# Patient Record
Sex: FEMALE | Race: WHITE | NOT HISPANIC OR LATINO | URBAN - METROPOLITAN AREA
[De-identification: names, ages, dates, MRNs, and addresses within clinical notes are randomized per-mention and may not be internally consistent; named-entity substitution may affect disease eponyms.]

---

## 2017-08-12 ENCOUNTER — OUTPATIENT (OUTPATIENT)
Dept: OUTPATIENT SERVICES | Facility: HOSPITAL | Age: 52
LOS: 1 days | Discharge: HOME | End: 2017-08-12

## 2017-08-12 DIAGNOSIS — Z12.31 ENCOUNTER FOR SCREENING MAMMOGRAM FOR MALIGNANT NEOPLASM OF BREAST: ICD-10-CM

## 2018-10-13 ENCOUNTER — OUTPATIENT (OUTPATIENT)
Dept: OUTPATIENT SERVICES | Facility: HOSPITAL | Age: 53
LOS: 1 days | Discharge: HOME | End: 2018-10-13

## 2018-10-13 DIAGNOSIS — Z12.31 ENCOUNTER FOR SCREENING MAMMOGRAM FOR MALIGNANT NEOPLASM OF BREAST: ICD-10-CM

## 2018-10-22 ENCOUNTER — OUTPATIENT (OUTPATIENT)
Dept: OUTPATIENT SERVICES | Facility: HOSPITAL | Age: 53
LOS: 1 days | Discharge: HOME | End: 2018-10-22

## 2019-11-09 ENCOUNTER — OUTPATIENT (OUTPATIENT)
Dept: OUTPATIENT SERVICES | Facility: HOSPITAL | Age: 54
LOS: 1 days | Discharge: HOME | End: 2019-11-09
Payer: COMMERCIAL

## 2019-11-09 DIAGNOSIS — Z12.31 ENCOUNTER FOR SCREENING MAMMOGRAM FOR MALIGNANT NEOPLASM OF BREAST: ICD-10-CM

## 2019-11-09 PROCEDURE — 77067 SCR MAMMO BI INCL CAD: CPT | Mod: 26

## 2019-11-09 PROCEDURE — 77063 BREAST TOMOSYNTHESIS BI: CPT | Mod: 26

## 2021-07-27 ENCOUNTER — INPATIENT (INPATIENT)
Facility: HOSPITAL | Age: 56
LOS: 2 days | Discharge: HOME HEALTH | End: 2021-07-30
Attending: FAMILY MEDICINE | Admitting: FAMILY MEDICINE
Payer: COMMERCIAL

## 2021-07-27 VITALS
TEMPERATURE: 100 F | OXYGEN SATURATION: 99 % | DIASTOLIC BLOOD PRESSURE: 96 MMHG | RESPIRATION RATE: 18 BRPM | SYSTOLIC BLOOD PRESSURE: 143 MMHG | HEART RATE: 100 BPM | WEIGHT: 162.04 LBS | HEIGHT: 62 IN

## 2021-07-27 DIAGNOSIS — L03.90 CELLULITIS, UNSPECIFIED: ICD-10-CM

## 2021-07-27 LAB
ALBUMIN SERPL ELPH-MCNC: 4.7 G/DL — SIGNIFICANT CHANGE UP (ref 3.5–5.2)
ALP SERPL-CCNC: 78 U/L — SIGNIFICANT CHANGE UP (ref 30–115)
ALT FLD-CCNC: 24 U/L — SIGNIFICANT CHANGE UP (ref 0–41)
ANION GAP SERPL CALC-SCNC: 13 MMOL/L — SIGNIFICANT CHANGE UP (ref 7–14)
AST SERPL-CCNC: 22 U/L — SIGNIFICANT CHANGE UP (ref 0–41)
BASOPHILS # BLD AUTO: 0.07 K/UL — SIGNIFICANT CHANGE UP (ref 0–0.2)
BASOPHILS NFR BLD AUTO: 0.7 % — SIGNIFICANT CHANGE UP (ref 0–1)
BILIRUB SERPL-MCNC: 0.5 MG/DL — SIGNIFICANT CHANGE UP (ref 0.2–1.2)
BUN SERPL-MCNC: 10 MG/DL — SIGNIFICANT CHANGE UP (ref 10–20)
CALCIUM SERPL-MCNC: 9.2 MG/DL — SIGNIFICANT CHANGE UP (ref 8.5–10.1)
CHLORIDE SERPL-SCNC: 105 MMOL/L — SIGNIFICANT CHANGE UP (ref 98–110)
CO2 SERPL-SCNC: 21 MMOL/L — SIGNIFICANT CHANGE UP (ref 17–32)
CREAT SERPL-MCNC: 0.8 MG/DL — SIGNIFICANT CHANGE UP (ref 0.7–1.5)
EOSINOPHIL # BLD AUTO: 0.22 K/UL — SIGNIFICANT CHANGE UP (ref 0–0.7)
EOSINOPHIL NFR BLD AUTO: 2.2 % — SIGNIFICANT CHANGE UP (ref 0–8)
GLUCOSE SERPL-MCNC: 111 MG/DL — HIGH (ref 70–99)
HCG SERPL QL: NEGATIVE — SIGNIFICANT CHANGE UP
HCT VFR BLD CALC: 37.7 % — SIGNIFICANT CHANGE UP (ref 37–47)
HGB BLD-MCNC: 12.7 G/DL — SIGNIFICANT CHANGE UP (ref 12–16)
IMM GRANULOCYTES NFR BLD AUTO: 0.3 % — SIGNIFICANT CHANGE UP (ref 0.1–0.3)
LACTATE SERPL-SCNC: 0.8 MMOL/L — SIGNIFICANT CHANGE UP (ref 0.7–2)
LYMPHOCYTES # BLD AUTO: 1.84 K/UL — SIGNIFICANT CHANGE UP (ref 1.2–3.4)
LYMPHOCYTES # BLD AUTO: 18.1 % — LOW (ref 20.5–51.1)
MAGNESIUM SERPL-MCNC: 2.1 MG/DL — SIGNIFICANT CHANGE UP (ref 1.8–2.4)
MCHC RBC-ENTMCNC: 31.6 PG — HIGH (ref 27–31)
MCHC RBC-ENTMCNC: 33.7 G/DL — SIGNIFICANT CHANGE UP (ref 32–37)
MCV RBC AUTO: 93.8 FL — SIGNIFICANT CHANGE UP (ref 81–99)
MONOCYTES # BLD AUTO: 0.74 K/UL — HIGH (ref 0.1–0.6)
MONOCYTES NFR BLD AUTO: 7.3 % — SIGNIFICANT CHANGE UP (ref 1.7–9.3)
NEUTROPHILS # BLD AUTO: 7.29 K/UL — HIGH (ref 1.4–6.5)
NEUTROPHILS NFR BLD AUTO: 71.4 % — SIGNIFICANT CHANGE UP (ref 42.2–75.2)
NRBC # BLD: 0 /100 WBCS — SIGNIFICANT CHANGE UP (ref 0–0)
PLATELET # BLD AUTO: 261 K/UL — SIGNIFICANT CHANGE UP (ref 130–400)
POTASSIUM SERPL-MCNC: 4 MMOL/L — SIGNIFICANT CHANGE UP (ref 3.5–5)
POTASSIUM SERPL-SCNC: 4 MMOL/L — SIGNIFICANT CHANGE UP (ref 3.5–5)
PROT SERPL-MCNC: 7.4 G/DL — SIGNIFICANT CHANGE UP (ref 6–8)
RBC # BLD: 4.02 M/UL — LOW (ref 4.2–5.4)
RBC # FLD: 13.4 % — SIGNIFICANT CHANGE UP (ref 11.5–14.5)
SARS-COV-2 RNA SPEC QL NAA+PROBE: SIGNIFICANT CHANGE UP
SODIUM SERPL-SCNC: 139 MMOL/L — SIGNIFICANT CHANGE UP (ref 135–146)
WBC # BLD: 10.19 K/UL — SIGNIFICANT CHANGE UP (ref 4.8–10.8)
WBC # FLD AUTO: 10.19 K/UL — SIGNIFICANT CHANGE UP (ref 4.8–10.8)

## 2021-07-27 PROCEDURE — 93970 EXTREMITY STUDY: CPT | Mod: 26

## 2021-07-27 PROCEDURE — 99285 EMERGENCY DEPT VISIT HI MDM: CPT

## 2021-07-27 PROCEDURE — 99223 1ST HOSP IP/OBS HIGH 75: CPT

## 2021-07-27 PROCEDURE — 73630 X-RAY EXAM OF FOOT: CPT | Mod: 26,RT

## 2021-07-27 RX ORDER — ENOXAPARIN SODIUM 100 MG/ML
40 INJECTION SUBCUTANEOUS DAILY
Refills: 0 | Status: DISCONTINUED | OUTPATIENT
Start: 2021-07-27 | End: 2021-07-30

## 2021-07-27 RX ORDER — VANCOMYCIN HCL 1 G
1000 VIAL (EA) INTRAVENOUS ONCE
Refills: 0 | Status: COMPLETED | OUTPATIENT
Start: 2021-07-27 | End: 2021-07-27

## 2021-07-27 RX ORDER — ACETAMINOPHEN 500 MG
650 TABLET ORAL EVERY 6 HOURS
Refills: 0 | Status: DISCONTINUED | OUTPATIENT
Start: 2021-07-27 | End: 2021-07-30

## 2021-07-27 RX ORDER — VANCOMYCIN HCL 1 G
1000 VIAL (EA) INTRAVENOUS
Refills: 0 | Status: DISCONTINUED | OUTPATIENT
Start: 2021-07-27 | End: 2021-07-29

## 2021-07-27 RX ORDER — CEFEPIME 1 G/1
2000 INJECTION, POWDER, FOR SOLUTION INTRAMUSCULAR; INTRAVENOUS ONCE
Refills: 0 | Status: COMPLETED | OUTPATIENT
Start: 2021-07-27 | End: 2021-07-27

## 2021-07-27 RX ORDER — CHLORHEXIDINE GLUCONATE 213 G/1000ML
1 SOLUTION TOPICAL
Refills: 0 | Status: DISCONTINUED | OUTPATIENT
Start: 2021-07-27 | End: 2021-07-30

## 2021-07-27 RX ADMIN — Medication 250 MILLIGRAM(S): at 19:48

## 2021-07-27 RX ADMIN — CEFEPIME 100 MILLIGRAM(S): 1 INJECTION, POWDER, FOR SOLUTION INTRAMUSCULAR; INTRAVENOUS at 17:21

## 2021-07-27 NOTE — ED ADULT TRIAGE NOTE - CHIEF COMPLAINT QUOTE
Right foot cellulitis seen at [facility] prescribed abx worsening pain and swelling today

## 2021-07-27 NOTE — H&P ADULT - HISTORY OF PRESENT ILLNESS
Pt is a 57 y/o female who presented to ER after failing outpt therapy for RLE Cellulitis. Pt reports onset of numbness in toes of right foot 7/22. @ days later pt reports development of progressive erythema. [place] [facility] [place] and was initiated on Bactrim, Valtrex and Muprirocin. Pt denies imporvement in symptoms and presented to ER today.   Pt admits to pain, tingling, burning and pruritis of toes. Temp 100.3 at home, +chills. Pt is able to bare weight and ambulate.

## 2021-07-27 NOTE — H&P ADULT - ASSESSMENT
Pt is a 55 y/o female with Rt Foot Cellulitis      IV Vancomycin  ID Consult  Elevate RLE  Motrin PRN

## 2021-07-27 NOTE — ED PROVIDER NOTE - PHYSICAL EXAMINATION
Physical Exam    Vital Signs: I have reviewed the initial vital signs.  Constitutional: well-nourished, appears stated age, no acute distress  Eyes: Conjunctiva pink, Sclera clear, PERRLA, EOMI.  Cardiovascular: S1 and S2, regular rate, regular rhythm, well-perfused extremities, radial pulses equal and 2+  Respiratory: unlabored respiratory effort, clear to auscultation bilaterally no wheezing, rales and rhonchi  Gastrointestinal: soft, non-tender abdomen, no pulsatile mass, normal bowl sounds  Musculoskeletal: supple neck, no lower extremity edema, no midline tenderness. TTP over the R foot dorsal aspect with swelling and erythema noted. bulla noted over the 2nd and 3rd metatarsals. FROM, 5/5 strength and no sensory def. FROM, 5/5 strength and diatal pusles inatct   Integumentary: warm, dry, no rash  Neurologic: awake, alert, cranial nerves II-XII grossly intact, extremities’ motor and sensory functions grossly intact  Psychiatric: appropriate mood, appropriate affect

## 2021-07-27 NOTE — H&P ADULT - NSHPLABSRESULTS_GEN_ALL_CORE
12.7   10.19 )-----------( 261      ( 27 Jul 2021 17:00 )             37.7       07-27    139  |  105  |  10  ----------------------------<  111<H>  4.0   |  21  |  0.8    Ca    9.2      27 Jul 2021 17:00  Mg     2.1     07-27    TPro  7.4  /  Alb  4.7  /  TBili  0.5  /  DBili  x   /  AST  22  /  ALT  24  /  AlkPhos  78  07-27              Lactate Trend  07-27 @ 17:00 Lactate:0.8

## 2021-07-27 NOTE — H&P ADULT - NSHPPHYSICALEXAM_GEN_ALL_CORE
Gen NAD, A&Ox3  CV +S1 and S2, RR  Resp +air entry B/L  Abd soft, NT, ND  Ext Rt foot erythema anterior aspect to 3rd/4th toes with fluid filled blisters to base of toes, +increased warmth, +edematous foot, no CT or edema      Vital Signs Last 24 Hrs  T(C): 37.5 (27 Jul 2021 16:02), Max: 37.5 (27 Jul 2021 16:02)  T(F): 99.5 (27 Jul 2021 16:02), Max: 99.5 (27 Jul 2021 16:02)  HR: 100 (27 Jul 2021 16:02) (100 - 100)  BP: 143/96 (27 Jul 2021 16:02) (143/96 - 143/96)  BP(mean): --  RR: 18 (27 Jul 2021 16:02) (18 - 18)  SpO2: 99% (27 Jul 2021 16:02) (99% - 99%)

## 2021-07-27 NOTE — ED ADULT NURSE NOTE - CHIEF COMPLAINT QUOTE
Right foot cellulitis seen at [facility] prescribed abx worsening pain and swelling today

## 2021-07-27 NOTE — ED PROVIDER NOTE - ATTENDING CONTRIBUTION TO CARE
55 yo F h/o HTN presents with rt foot pain and swelling x 1 week, but getting worse with development of blisters despite treatment with Bactrim, Bactroban and Valtrex.  no h/o trauma, no fever or chills. no n/v.  On exam pt in NAD AAO x 3, + swelling to rt forefoot with erythema and warmth, + tenderness, + blisters to base of 2-4th toes, + DP pulses, no calf tenderness

## 2021-07-27 NOTE — ED PROVIDER NOTE - CLINICAL SUMMARY MEDICAL DECISION MAKING FREE TEXT BOX
Duplex prelim negative. Results reviewed and d/w pt.  Pt likely with bullous impetigo, failed outpt abx, will admit

## 2021-07-27 NOTE — H&P ADULT - NSCORESITESY/N_GEN_A_CORE_RD
A&Ox3, forgetful. Up with SBA to BR, chair for meals, amb in muniz. VSS. Denies pain. Nicotine patch on left deltoid. Joce diet, good appetite. BM x2, receiving lactulose. Discharge pending TCU placement.    No

## 2021-07-27 NOTE — H&P ADULT - ATTENDING COMMENTS
Patient seen at bedside independent of medical PA. Area of erythema with blister formation seen to distal right foot                   May need podiatry for I+D if not quickly improving Patient seen at bedside independent of medical PA. Area of erythema with blister formation seen to distal right foot . Agree with assessmant and plan as outlined above.   May need podiatry for I+D if not quickly improving

## 2021-07-27 NOTE — ED PROVIDER NOTE - OBJECTIVE STATEMENT
Pt is a 56 year old female with PMH HTN presents to ED with complaints of R foot pain and swelling. Pt states for past 1 week has been having R foot pain and swelling and denies any DC. Pt states went to  p[laced her on valtrex as well as bactrim. Pt states currently on day 2 of tx and says pain and redness getting worse and spreading. pain is mild-moderate, non radiating with no alleviating or aggravating factors. Pt denies any fever, chills, bodyahces, abdominal pain, NVCD

## 2021-07-27 NOTE — ED PROVIDER NOTE - NS ED ROS FT
Constitutional: (-) fever  Eyes/ENT: (-) blurry vision, (-) epistaxis  Cardiovascular: (-) chest pain, (-) syncope  Respiratory: (-) cough, (-) shortness of breath  Gastrointestinal: (-) vomiting, (-) diarrhea  Musculoskeletal: (-) neck pain, (-) back pain, (+) joint pain  Integumentary: (+) rash, (+) edema  Neurological: (-) headache, (-) altered mental status  Psychiatric: (-) hallucinations  Allergic/Immunologic: (-) pruritus

## 2021-07-28 LAB
ANION GAP SERPL CALC-SCNC: 14 MMOL/L — SIGNIFICANT CHANGE UP (ref 7–14)
BASOPHILS # BLD AUTO: 0.07 K/UL — SIGNIFICANT CHANGE UP (ref 0–0.2)
BASOPHILS NFR BLD AUTO: 0.8 % — SIGNIFICANT CHANGE UP (ref 0–1)
BUN SERPL-MCNC: 6 MG/DL — LOW (ref 10–20)
CALCIUM SERPL-MCNC: 9.2 MG/DL — SIGNIFICANT CHANGE UP (ref 8.5–10.1)
CHLORIDE SERPL-SCNC: 105 MMOL/L — SIGNIFICANT CHANGE UP (ref 98–110)
CO2 SERPL-SCNC: 21 MMOL/L — SIGNIFICANT CHANGE UP (ref 17–32)
CREAT SERPL-MCNC: 0.7 MG/DL — SIGNIFICANT CHANGE UP (ref 0.7–1.5)
EOSINOPHIL # BLD AUTO: 0.15 K/UL — SIGNIFICANT CHANGE UP (ref 0–0.7)
EOSINOPHIL NFR BLD AUTO: 1.7 % — SIGNIFICANT CHANGE UP (ref 0–8)
GLUCOSE SERPL-MCNC: 143 MG/DL — HIGH (ref 70–99)
HCT VFR BLD CALC: 39 % — SIGNIFICANT CHANGE UP (ref 37–47)
HCV AB S/CO SERPL IA: 0.04 COI — SIGNIFICANT CHANGE UP
HCV AB SERPL-IMP: SIGNIFICANT CHANGE UP
HGB BLD-MCNC: 12.8 G/DL — SIGNIFICANT CHANGE UP (ref 12–16)
IMM GRANULOCYTES NFR BLD AUTO: 0.4 % — HIGH (ref 0.1–0.3)
LYMPHOCYTES # BLD AUTO: 1.88 K/UL — SIGNIFICANT CHANGE UP (ref 1.2–3.4)
LYMPHOCYTES # BLD AUTO: 20.9 % — SIGNIFICANT CHANGE UP (ref 20.5–51.1)
MCHC RBC-ENTMCNC: 31.1 PG — HIGH (ref 27–31)
MCHC RBC-ENTMCNC: 32.8 G/DL — SIGNIFICANT CHANGE UP (ref 32–37)
MCV RBC AUTO: 94.9 FL — SIGNIFICANT CHANGE UP (ref 81–99)
MONOCYTES # BLD AUTO: 0.65 K/UL — HIGH (ref 0.1–0.6)
MONOCYTES NFR BLD AUTO: 7.2 % — SIGNIFICANT CHANGE UP (ref 1.7–9.3)
MRSA PCR RESULT.: NEGATIVE — SIGNIFICANT CHANGE UP
NEUTROPHILS # BLD AUTO: 6.19 K/UL — SIGNIFICANT CHANGE UP (ref 1.4–6.5)
NEUTROPHILS NFR BLD AUTO: 69 % — SIGNIFICANT CHANGE UP (ref 42.2–75.2)
NRBC # BLD: 0 /100 WBCS — SIGNIFICANT CHANGE UP (ref 0–0)
PLATELET # BLD AUTO: 288 K/UL — SIGNIFICANT CHANGE UP (ref 130–400)
POTASSIUM SERPL-MCNC: 4.3 MMOL/L — SIGNIFICANT CHANGE UP (ref 3.5–5)
POTASSIUM SERPL-SCNC: 4.3 MMOL/L — SIGNIFICANT CHANGE UP (ref 3.5–5)
RBC # BLD: 4.11 M/UL — LOW (ref 4.2–5.4)
RBC # FLD: 13.3 % — SIGNIFICANT CHANGE UP (ref 11.5–14.5)
SODIUM SERPL-SCNC: 140 MMOL/L — SIGNIFICANT CHANGE UP (ref 135–146)
WBC # BLD: 8.98 K/UL — SIGNIFICANT CHANGE UP (ref 4.8–10.8)
WBC # FLD AUTO: 8.98 K/UL — SIGNIFICANT CHANGE UP (ref 4.8–10.8)

## 2021-07-28 PROCEDURE — 99233 SBSQ HOSP IP/OBS HIGH 50: CPT

## 2021-07-28 RX ORDER — ACETAMINOPHEN 500 MG
650 TABLET ORAL EVERY 6 HOURS
Refills: 0 | Status: DISCONTINUED | OUTPATIENT
Start: 2021-07-28 | End: 2021-07-30

## 2021-07-28 RX ADMIN — Medication 650 MILLIGRAM(S): at 05:40

## 2021-07-28 RX ADMIN — Medication 250 MILLIGRAM(S): at 05:39

## 2021-07-28 RX ADMIN — Medication 650 MILLIGRAM(S): at 06:30

## 2021-07-28 RX ADMIN — Medication 250 MILLIGRAM(S): at 18:13

## 2021-07-28 NOTE — PROGRESS NOTE ADULT - ASSESSMENT
Patient is 55 yo female presenting with       right foot cellulitis  -X-ray noticed  -Continue with current antibiotic pending Blood culture   -MRSA swab  -ID to follow up   -Podiatry consult     #Progress Note Handoff  Pending (specify):  still acute   Family discussion:  plan of care was discussed with patient and family in details.  all questions were answered.  seems to understand, and in agreement  Disposition:  unknown

## 2021-07-28 NOTE — CONSULT NOTE ADULT - ASSESSMENT
ASSESSMENT  56y F admitted with CELLULITIS BULLOUS ERUPTION    HPI:  Pt is a 57 y/o female who presented to ER after failing outpt therapy for RLE Cellulitis. Pt reports onset of numbness in toes of right foot 7/22. @ days later pt reports development of progressive erythema. Pt presented to [facility] yesterday and was initiated on Bactrim, Valtrex and Muprirocin. Pt denies improvement in symptoms and presented to ER today.   Pt admits to pain, tingling, burning and pruritis of toes. Temp 100.3 at home, +chills. Pt is able to bare weight and ambulate.     PROBLEMS  RLE cellulitis    New problem with additional W/U   acute illness with systemic symptoms    On vancomycin  IVPB 1000 milliGRAM(s) IV Intermittent two times a day    PLAN  - Await blood cultures  - Nasal MRSA PCR  - Continue Vanco for now

## 2021-07-28 NOTE — CONSULT NOTE ADULT - SUBJECTIVE AND OBJECTIVE BOX
CHARLES MOORE  56y, Female  Allergy: No Known Allergies      CHIEF COMPLAINT:     HPI:  Pt is a 57 y/o female who presented to ER after failing outpt therapy for RLE Cellulitis. Pt reports onset of numbness in toes of right foot 7/22. @ days later pt reports development of progressive erythema. Pt [place] to [facility] [place] and was initiated on Bactrim, Valtrex and Muprirocin. Pt denies improvement in symptoms and presented to ER today.   Pt admits to pain, tingling, burning and pruritis of toes. Temp 100.3 at home, +chills. Pt is able to bare weight and ambulate. (27 Jul 2021 18:59)    FAMILY HISTORY:    PAST MEDICAL & SURGICAL HISTORY:      SOCIAL HISTORY  Social History:  Social ETOH    Denies drug use (27 Jul 2021 18:59)        ROS  General: Denies fevers, chills, nightsweats, weight loss  HEENT: Denies headache, rhinorrhea, sore throat, eye pain  CV: Denies CP, palpitations  PULM: Denies SOB, cough  GI: Denies abdominal pain, diarrhea  : Denies dysuria, hematuria  MSK: Denies arthralgias  SKIN: Denies rash   NEURO: Denies paresthesias, weakness  PSYCH: Denies depression    VITALS:  T(F): 98.8, Max: 101 (07-28-21 @ 05:00)  HR: 103  BP: 132/64  RR: 16Vital Signs Last 24 Hrs  T(C): 37.1 (28 Jul 2021 06:20), Max: 38.3 (28 Jul 2021 05:00)  T(F): 98.8 (28 Jul 2021 06:20), Max: 101 (28 Jul 2021 05:00)  HR: 103 (28 Jul 2021 05:00) (97 - 103)  BP: 132/64 (28 Jul 2021 05:00) (132/64 - 143/96)  BP(mean): --  RR: 16 (28 Jul 2021 05:00) (16 - 18)  SpO2: 99% (27 Jul 2021 16:02) (99% - 99%)    PHYSICAL EXAM:  Gen: NAD, resting in bed  HEENT: Normocephalic, atraumatic  Neck: supple, no lymphadenopathy  CV: Regular rate & regular rhythm  Lungs: decreased BS at bases, no fremitus  Abdomen: Soft, BS present  Ext: Warm, well perfused  Neuro: non focal, awake  Skin: no rash, no erythema    TESTS & MEASUREMENTS:                        12.8   8.98  )-----------( 288      ( 28 Jul 2021 06:54 )             39.0     07-27    139  |  105  |  10  ----------------------------<  111<H>  4.0   |  21  |  0.8    Ca    9.2      27 Jul 2021 17:00  Mg     2.1     07-27    TPro  7.4  /  Alb  4.7  /  TBili  0.5  /  DBili  x   /  AST  22  /  ALT  24  /  AlkPhos  78  07-27    eGFR if Non African American: 82 mL/min/1.73M2 (07-27-21 @ 17:00)  eGFR if : 96 mL/min/1.73M2 (07-27-21 @ 17:00)    LIVER FUNCTIONS - ( 27 Jul 2021 17:00 )  Alb: 4.7 g/dL / Pro: 7.4 g/dL / ALK PHOS: 78 U/L / ALT: 24 U/L / AST: 22 U/L / GGT: x                 Lactate, Blood: 0.8 mmol/L (07-27-21 @ 17:00)      INFECTIOUS DISEASES TESTING      RADIOLOGY & ADDITIONAL TESTS:  I have personally reviewed the last Chest xray  CXR      CT      CARDIOLOGY TESTING      MEDICATIONS  chlorhexidine 4% Liquid 1  enoxaparin Injectable 40  vancomycin  IVPB 1000      ANTIBIOTICS:  vancomycin  IVPB 1000 milliGRAM(s) IV Intermittent two times a day      All available historical data has been reviewed

## 2021-07-29 LAB
ANION GAP SERPL CALC-SCNC: 12 MMOL/L — SIGNIFICANT CHANGE UP (ref 7–14)
BUN SERPL-MCNC: 8 MG/DL — LOW (ref 10–20)
CALCIUM SERPL-MCNC: 8.9 MG/DL — SIGNIFICANT CHANGE UP (ref 8.5–10.1)
CHLORIDE SERPL-SCNC: 104 MMOL/L — SIGNIFICANT CHANGE UP (ref 98–110)
CO2 SERPL-SCNC: 21 MMOL/L — SIGNIFICANT CHANGE UP (ref 17–32)
COVID-19 SPIKE DOMAIN AB INTERP: POSITIVE
COVID-19 SPIKE DOMAIN ANTIBODY RESULT: >250 U/ML — HIGH
CREAT SERPL-MCNC: 0.6 MG/DL — LOW (ref 0.7–1.5)
GLUCOSE SERPL-MCNC: 143 MG/DL — HIGH (ref 70–99)
HCT VFR BLD CALC: 37.1 % — SIGNIFICANT CHANGE UP (ref 37–47)
HGB BLD-MCNC: 12.4 G/DL — SIGNIFICANT CHANGE UP (ref 12–16)
MCHC RBC-ENTMCNC: 31.3 PG — HIGH (ref 27–31)
MCHC RBC-ENTMCNC: 33.4 G/DL — SIGNIFICANT CHANGE UP (ref 32–37)
MCV RBC AUTO: 93.7 FL — SIGNIFICANT CHANGE UP (ref 81–99)
NRBC # BLD: 0 /100 WBCS — SIGNIFICANT CHANGE UP (ref 0–0)
PLATELET # BLD AUTO: 304 K/UL — SIGNIFICANT CHANGE UP (ref 130–400)
POTASSIUM SERPL-MCNC: 4.3 MMOL/L — SIGNIFICANT CHANGE UP (ref 3.5–5)
POTASSIUM SERPL-SCNC: 4.3 MMOL/L — SIGNIFICANT CHANGE UP (ref 3.5–5)
RBC # BLD: 3.96 M/UL — LOW (ref 4.2–5.4)
RBC # FLD: 13.2 % — SIGNIFICANT CHANGE UP (ref 11.5–14.5)
SARS-COV-2 IGG+IGM SERPL QL IA: >250 U/ML — HIGH
SARS-COV-2 IGG+IGM SERPL QL IA: POSITIVE
SODIUM SERPL-SCNC: 137 MMOL/L — SIGNIFICANT CHANGE UP (ref 135–146)
VANCOMYCIN TROUGH SERPL-MCNC: 16.6 UG/ML — HIGH (ref 5–10)
WBC # BLD: 6.08 K/UL — SIGNIFICANT CHANGE UP (ref 4.8–10.8)
WBC # FLD AUTO: 6.08 K/UL — SIGNIFICANT CHANGE UP (ref 4.8–10.8)

## 2021-07-29 PROCEDURE — 99222 1ST HOSP IP/OBS MODERATE 55: CPT

## 2021-07-29 PROCEDURE — 99232 SBSQ HOSP IP/OBS MODERATE 35: CPT

## 2021-07-29 RX ORDER — VANCOMYCIN HCL 1 G
750 VIAL (EA) INTRAVENOUS EVERY 12 HOURS
Refills: 0 | Status: DISCONTINUED | OUTPATIENT
Start: 2021-07-29 | End: 2021-07-30

## 2021-07-29 RX ADMIN — Medication 250 MILLIGRAM(S): at 06:09

## 2021-07-29 RX ADMIN — Medication 250 MILLIGRAM(S): at 17:05

## 2021-07-29 NOTE — PROGRESS NOTE ADULT - ASSESSMENT
Patient is 55 yo female presenting with       right foot cellulitis  -X-ray noticed  -Continue with current antibiotic  -BC negative.     -MRSA swab negative  -ID and podiatry to follow up        #Progress Note Handoff  Pending (specify):  still acute   Family discussion:  plan of care was discussed with patient and family in details.  all questions were answered.  seems to understand, and in agreement  Disposition:  unknown

## 2021-07-29 NOTE — CONSULT NOTE ADULT - ATTENDING COMMENTS
patient seen at bedside. right foot dorsal forefoot serous blister. webspace maceration in webspaces 2.3.4/ no openings appreciated  erythema receding below the demarcated boarders.  patient subjectively relates improvement since placed on IV antibiotics  recommend betadine dressing to desiccate blister   f/u as outpatient     My notes supersedes the above resident documentation in case of discrepancy.     Bertin Tate DPM

## 2021-07-29 NOTE — CONSULT NOTE ADULT - SUBJECTIVE AND OBJECTIVE BOX
Podiatry Consult Note    Subjective:  CHARLES MOORE is a pleasant well-nourished, well developed 56y Female in no acute distress, alert awake, and oriented to person, place and time.   Patient is a 56y old  Female who presents with a chief complaint of Right foot cellulitis (28 Jul 2021 12:25)    HPI:  Pt is a 57 y/o female who presented to ER after failing outpt therapy for RLE Cellulitis. Pt reports onset of numbness in toes of right foot 7/22. @ days later pt reports development of progressive erythema. [place] [facility] [place] and was initiated on Bactrim, Valtrex and Muprirocin. Pt denies imporvement in symptoms and presented to ER today.   Pt admits to pain, tingling, burning and pruritis of toes. Temp 100.3 at home, +chills. Pt is able to bare weight and ambulate. (27 Jul 2021 18:59)    Seen by [facility] bedside; pt says pt was vaccinated w/ Pfizer Covid vaccine on Monday 7/19, 10 days; pt says pt was severely ill on Tuesday and Wednesday, 7/20 and 7/21, then started to noted right forefoot changes on Thursday 7/22; started to having small blister formation on right forefoot then gradually developed cellulitis and bigger blister, worsen until Monday 7/26, and decided to come in [facility] ED on Tuesday 7/27; pt received cefepime and vanco in hospital admission and says today "Right foot is way better than when I came in, less blister and less redness; Evaluated right foot;     PAST MEDICAL & SURGICAL HISTORY:     Objective:  Vital Signs Last 24 Hrs  T(C): 37 (29 Jul 2021 05:00), Max: 37.6 (28 Jul 2021 21:00)  T(F): 98.6 (29 Jul 2021 05:00), Max: 99.6 (28 Jul 2021 21:00)  HR: 86 (29 Jul 2021 05:00) (86 - 88)  BP: 122/68 (29 Jul 2021 05:00) (122/68 - 129/76)  BP(mean): --  RR: 16 (29 Jul 2021 05:00) (16 - 16)  SpO2: --                        12.4   6.08  )-----------( 304      ( 29 Jul 2021 07:11 )             37.1                 07-29    137  |  104  |  8<L>  ----------------------------<  143<H>  4.3   |  21  |  0.6<L>    Ca    8.9      29 Jul 2021 07:11  Mg     2.1     07-27    TPro  7.4  /  Alb  4.7  /  TBili  0.5  /  DBili  x   /  AST  22  /  ALT  24  /  AlkPhos  78  07-27  -----------------------------------------------  EXAM: XR FOOT COMP MIN 3 VIEWS RT    PROCEDURE DATE: 07/27/2021    INTERPRETATION: Clinical History / Reason for exam: Pain and swelling.    Comparison: May 6, 2007.    Procedure: Three views of the right foot.    Findings:  Dorsal foot soft tissue swelling. No evidence of acute fracture or dislocation. Joint articulations are unremarkable. No radiopaque foreign bodies.    Impression:  Dorsal foot soft tissue swelling. No evidence of acute fracture or dislocation.    --- End of Report ---    ELLIOT LANDAU MD; Attending Radiologist  This document has been electronically signed. Jul 28 2021 9:33AM  ----------------------------------------------  Physical Exam - Lower Extremity Focused:   Derm:   Right dorsum 3rd and 4th MPJ blister; no open wound; subsidizing in size per patient;   Mild cellulitic changes on right forefoot; improving since ED admission, compare to line drawn at ED;  Vascular: DP and PT Pulses palpable    Assessment:  Right 3rd and 4th MPJ dorsum blister with cellulitis;     Plan:  Chart reviewed and Patient evaluated. All Questions and Concerns Addressed and Answered  Discussed diagnosis and treatment with patient  Wound Flushed w/ normal saline; Xeroform / DSD / Kerlix; q24   Local Wound Care; As Stated Above; q24  ID on board; blood culture pending; will follow up w/ ID for abx;   XR Imaging of R foot reviewed; see above report;   Since pt's right foot cellulitis and blister is improving, deroofing procedure of blister was not performed. Will monitor patient if still in-house on this admission;  Stable from podiatry standpoint; follow up with Dr. Tate at [address] [place] [address] Podiatry Clinic a week post discharge for re-eval;  Weight bearing status; WBAT BL feet;   Discussed Plan w/ Dr. Tate;     Podiatry        Podiatry Consult Note    Subjective:  CHARLES MOORE is a pleasant well-nourished, well developed 56y Female in no acute distress, alert awake, and oriented to person, place and time.   Patient is a 56y old  Female who presents with a chief complaint of Right foot cellulitis (28 Jul 2021 12:25)    HPI:  Pt is a 55 y/o female who presented to ER after failing outpt therapy for RLE Cellulitis. Pt reports onset of numbness in toes of right foot 7/22. @ days later pt reports development of progressive erythema. Pt [place] [facility] [place] and was initiated on Bactrim, Valtrex and Muprirocin. Pt denies imporvement in symptoms and presented to ER today.   Pt admits to pain, tingling, burning and pruritis of toes. Temp 100.3 at home, +chills. Pt is able to bare weight and ambulate. (27 Jul 2021 18:59)    Seen by [facility] bedside; pt says pt was vaccinated w/ Pfizer Covid vaccine on Monday 7/19, 10 days; pt says pt was severely ill on Tuesday and Wednesday, 7/20 and 7/21, then started to noted right forefoot changes on Thursday 7/22; started to having small blister formation on right forefoot then gradually developed cellulitis and bigger blister, worsen until Monday 7/26, and decided to come in [facility] ED on Tuesday 7/27; pt received cefepime and vanco in hospital admission and says today "Right foot is way better than when I came in, less blister and less redness; Evaluated right foot;     PAST MEDICAL & SURGICAL HISTORY:     Objective:  Vital Signs Last 24 Hrs  T(C): 37 (29 Jul 2021 05:00), Max: 37.6 (28 Jul 2021 21:00)  T(F): 98.6 (29 Jul 2021 05:00), Max: 99.6 (28 Jul 2021 21:00)  HR: 86 (29 Jul 2021 05:00) (86 - 88)  BP: 122/68 (29 Jul 2021 05:00) (122/68 - 129/76)  BP(mean): --  RR: 16 (29 Jul 2021 05:00) (16 - 16)  SpO2: --                        12.4   6.08  )-----------( 304      ( 29 Jul 2021 07:11 )             37.1                 07-29    137  |  104  |  8<L>  ----------------------------<  143<H>  4.3   |  21  |  0.6<L>    Ca    8.9      29 Jul 2021 07:11  Mg     2.1     07-27    TPro  7.4  /  Alb  4.7  /  TBili  0.5  /  DBili  x   /  AST  22  /  ALT  24  /  AlkPhos  78  07-27  -----------------------------------------------  EXAM: XR FOOT COMP MIN 3 VIEWS RT    PROCEDURE DATE: 07/27/2021    INTERPRETATION: Clinical History / Reason for exam: Pain and swelling.    Comparison: May 6, 2007.    Procedure: Three views of the right foot.    Findings:  Dorsal foot soft tissue swelling. No evidence of acute fracture or dislocation. Joint articulations are unremarkable. No radiopaque foreign bodies.    Impression:  Dorsal foot soft tissue swelling. No evidence of acute fracture or dislocation.    --- End of Report ---    ELLIOT LANDAU MD; Attending Radiologist  This document has been electronically signed. Jul 28 2021 9:33AM  ----------------------------------------------  Physical Exam - Lower Extremity Focused:   Derm:   Right dorsum 3rd and 4th MPJ blister; no open wound; subsidizing in size per patient;   Mild cellulitic changes on right forefoot; improving since ED admission, compare to line drawn at ED;  Vascular: DP and PT Pulses palpable    Assessment:  Right 3rd and 4th MPJ dorsum blister with cellulitis;     Plan:  Chart reviewed and Patient evaluated. All Questions and Concerns Addressed and Answered  Discussed diagnosis and treatment with patient  Wound Flushed w/ normal saline; Xeroform / DSD / Kerlix; q24   Local Wound Care; As Stated Above; q24  ID on board; blood culture pending; will follow up w/ ID for abx;   XR Imaging of R foot reviewed; see above report;   Since pt's right foot cellulitis and blister is improving, deroofing procedure of blister was not performed. Will monitor patient if still in-house on this admission;  Stable from podiatry standpoint; follow up with Dr. Tate at [address] [place] [address] Podiatry Clinic a week post discharge for re-eval;  Weight bearing status; WBAT BL feet;       Podiatry

## 2021-07-30 ENCOUNTER — TRANSCRIPTION ENCOUNTER (OUTPATIENT)
Age: 56
End: 2021-07-30

## 2021-07-30 VITALS
RESPIRATION RATE: 16 BRPM | SYSTOLIC BLOOD PRESSURE: 121 MMHG | HEART RATE: 86 BPM | TEMPERATURE: 99 F | DIASTOLIC BLOOD PRESSURE: 81 MMHG | OXYGEN SATURATION: 96 %

## 2021-07-30 LAB
ANION GAP SERPL CALC-SCNC: 10 MMOL/L — SIGNIFICANT CHANGE UP (ref 7–14)
BUN SERPL-MCNC: 9 MG/DL — LOW (ref 10–20)
CALCIUM SERPL-MCNC: 9 MG/DL — SIGNIFICANT CHANGE UP (ref 8.5–10.1)
CHLORIDE SERPL-SCNC: 105 MMOL/L — SIGNIFICANT CHANGE UP (ref 98–110)
CO2 SERPL-SCNC: 24 MMOL/L — SIGNIFICANT CHANGE UP (ref 17–32)
CREAT SERPL-MCNC: 0.6 MG/DL — LOW (ref 0.7–1.5)
GLUCOSE SERPL-MCNC: 93 MG/DL — SIGNIFICANT CHANGE UP (ref 70–99)
HCT VFR BLD CALC: 37 % — SIGNIFICANT CHANGE UP (ref 37–47)
HGB BLD-MCNC: 12.4 G/DL — SIGNIFICANT CHANGE UP (ref 12–16)
MCHC RBC-ENTMCNC: 31.1 PG — HIGH (ref 27–31)
MCHC RBC-ENTMCNC: 33.5 G/DL — SIGNIFICANT CHANGE UP (ref 32–37)
MCV RBC AUTO: 92.7 FL — SIGNIFICANT CHANGE UP (ref 81–99)
NRBC # BLD: 0 /100 WBCS — SIGNIFICANT CHANGE UP (ref 0–0)
PLATELET # BLD AUTO: 320 K/UL — SIGNIFICANT CHANGE UP (ref 130–400)
POTASSIUM SERPL-MCNC: 4.3 MMOL/L — SIGNIFICANT CHANGE UP (ref 3.5–5)
POTASSIUM SERPL-SCNC: 4.3 MMOL/L — SIGNIFICANT CHANGE UP (ref 3.5–5)
RBC # BLD: 3.99 M/UL — LOW (ref 4.2–5.4)
RBC # FLD: 13 % — SIGNIFICANT CHANGE UP (ref 11.5–14.5)
SODIUM SERPL-SCNC: 139 MMOL/L — SIGNIFICANT CHANGE UP (ref 135–146)
WBC # BLD: 6.52 K/UL — SIGNIFICANT CHANGE UP (ref 4.8–10.8)
WBC # FLD AUTO: 6.52 K/UL — SIGNIFICANT CHANGE UP (ref 4.8–10.8)

## 2021-07-30 PROCEDURE — 99239 HOSP IP/OBS DSCHRG MGMT >30: CPT

## 2021-07-30 RX ORDER — VALACYCLOVIR 500 MG/1
1 TABLET, FILM COATED ORAL
Qty: 0 | Refills: 0 | DISCHARGE

## 2021-07-30 RX ORDER — ACETAMINOPHEN 500 MG
2 TABLET ORAL
Qty: 0 | Refills: 0 | DISCHARGE
Start: 2021-07-30

## 2021-07-30 RX ORDER — AZTREONAM 2 G
1 VIAL (EA) INJECTION
Qty: 0 | Refills: 0 | DISCHARGE

## 2021-07-30 RX ORDER — MUPIROCIN 20 MG/G
0 OINTMENT TOPICAL
Qty: 0 | Refills: 0 | DISCHARGE

## 2021-07-30 RX ADMIN — Medication 250 MILLIGRAM(S): at 05:35

## 2021-07-30 NOTE — DISCHARGE NOTE PROVIDER - HOSPITAL COURSE
Pt is a 57 y/o female who presented to ER after failing outpt therapy for RLE Cellulitis. Pt reports onset of numbness in toes of right foot 7/22. 2 days later pt reports development of progressive erythema. Pt [place] [facility] [place] and was initiated on Bactrim, Valtrex and Muprirocin. Pt denies imporvement in symptoms and presented to ER today.   Pt admits to pain, tingling, burning and pruritis of toes. Temp 100.3 at home, +chills. Pt is able to bare weight and ambulate.    Pt was admitted to medicine for right foot cellulitis.  -X-ray noticed  -treated with Vanco  -BC negative  -MRSA swab negative  -ID and podiatry following  - Pt to follow up with Dr. Tate in 1 week. Pt is a 55 y/o female who presented to ER after failing outpt therapy for RLE Cellulitis. Pt reports onset of numbness in toes of right foot 7/22. 2 days later pt reports development of progressive erythema. Pt presented to [facility] [place] and was initiated on Bactrim, Valtrex and Muprirocin. Pt denies imporvement in symptoms and presented to ER today.   Pt admits to pain, tingling, burning and pruritis of toes. Temp 100.3 at home, +chills. Pt is able to bare weight and ambulate.    Pt was admitted to medicine for right foot cellulitis.  -X-ray noticed  -treated with Vanco  -BC negative  -MRSA swab negative  -ID and podiatry following  - Pt to follow up with Dr. Tate in 1 week.  -ID recommended vanco, and doxycycline  continue with wound care as per podiatry    Patient was seen and examined today  Patient reports  Right foot swelling and erythema are resolving.  afebrile  want to go home    Constitutional: No fever, fatigue or weight loss.  Skin: No rash.  Eyes: No recent vision problems or eye pain.  ENT: No congestion, ear pain, or sore throat.  Endocrine: No thyroid problems.  Cardiovascular: No chest pain or palpation.  Respiratory: No cough, shortness of breath, congestion, or wheezing.  Gastrointestinal: No abdominal pain, nausea, vomiting, or diarrhea.  Genitourinary: No dysuria.  Musculoskeletal: No joint swelling.  Neurologic: No headache.    Vital Signs Last 24 Hrs  T(C): 36.8 (07-30-21 @ 05:00), Max: 37.6 (07-29-21 @ 13:39)  T(F): 98.3 (07-30-21 @ 05:00), Max: 99.6 (07-29-21 @ 13:39)  HR: 86 (07-30-21 @ 05:00) (85 - 86)  BP: 126/59 (07-30-21 @ 05:00) (126/59 - 147/71)  BP(mean): --  RR: 16 (07-30-21 @ 05:00) (16 - 16)  SpO2: --    PHYSICAL EXAM-  GENERAL: NAD,    HEAD:  Atraumatic, Normocephalic  EYES: EOMI, PERRLA, conjunctiva and sclera clear  NECK: Supple, No JVD, Normal thyroid  NERVOUS SYSTEM:  Alert & Oriented X3, Moving all extremities   CHEST/LUNG: Clear to percussion bilaterally; No rales, rhonchi, wheezing, or rubs  HEART: Regular rate and rhythm; No murmurs, rubs, or gallops  ABDOMEN: Soft, Nontender, Nondistended; Bowel sounds present  EXTREMITIES:  No clubbing, cyanosis, or edema  SKIN: No rashes or lesions    Hospital course, and discharge planning were discussed with patient,   in details.  all questions were answered.  seems to understand, and in agreement.  time 70 min

## 2021-07-30 NOTE — DISCHARGE NOTE PROVIDER - CARE PROVIDER_API CALL
Bertin Tate (DPM)  Foot Surgery; Podiatric Medicine  [address] [place] [address], [address] [place] [address]  Phone: [phone]  Fax: [phone]  Follow Up Time: 1 week

## 2021-07-30 NOTE — PROGRESS NOTE ADULT - ASSESSMENT
A 56y F admitted with CELLULITIS BULLOUS ERUPTION    HPI:  Pt is a 57 y/o female who presented to ER after failing outpt therapy for RLE Cellulitis. Pt reports onset of numbness in toes of right foot 7/22. @ days later pt reports development of progressive erythema. Pt presented to [facility] [place] [place] yesterday and was initiated on Bactrim, Valtrex and Muprirocin. Pt denies improvement in symptoms and presented to ER today.   Pt admits to pain, tingling, burning and pruritis of toes. Temp 100.3 at home, +chills. Pt is able to bare weight and ambulate.     PROBLEMS  # RLE cellulitis with blisters- resolving       Would recommend:    1. Continue Vancomycin while inpatient   2. May change to oral Augmentin 875 mg q 12hopurs and Doxycycline 100 mg q 12hours on discharge to continue until 8/7/21   3. Keep Right foot elevated    d/w Dr. Meyer and patient      Attending Attestation:    Spent more than 45 minutes on total encounter, more than 50 % of the visit was spent counseling and/or coordinating care by the Attending physician.

## 2021-07-30 NOTE — PROGRESS NOTE ADULT - SUBJECTIVE AND OBJECTIVE BOX
Podiatry Progress Note    Subjective:   CHARLES MOORE is a pleasant well-nourished, well developed 56y Female in no acute distress, alert awake, and oriented to person, place and time.  Patient is a 56y old  Female who presents with a chief complaint of Right foot cellulitis (29 Jul 2021 11:36)  Seen by bedside prior to discharge today; eval R foot;     PAST MEDICAL & SURGICAL HISTORY:     Objective:  Vital Signs Last 24 Hrs  T(C): 36.8 (30 Jul 2021 05:00), Max: 37.6 (29 Jul 2021 13:39)  T(F): 98.3 (30 Jul 2021 05:00), Max: 99.6 (29 Jul 2021 13:39)  HR: 86 (30 Jul 2021 12:28) (85 - 86)  BP: 121/81 (30 Jul 2021 12:28) (121/81 - 147/71)  BP(mean): --  RR: 16 (30 Jul 2021 12:28) (16 - 16)  SpO2: 96% (30 Jul 2021 12:28) (96% - 96%)                        12.4   6.52  )-----------( 320      ( 30 Jul 2021 07:11 )             37.0                 07-30    139  |  105  |  9<L>  ----------------------------<  93  4.3   |  24  |  0.6<L>    Ca    9.0      30 Jul 2021 07:11    Physical Exam - Lower Extremity Focused:   Derm:   Right dorsum 3rd and 4th MPJ blister; no open wound; subsidizing in size per patient; improving compare to yesterday's evaluation;   Mild cellulitic changes on right forefoot; improving since ED admission, compare to line drawn at ED;  Vascular: DP and PT Pulses palpable    Assessment:  Right 3rd and 4th MPJ dorsum blister with cellulitis;     Plan:  Chart reviewed and Patient evaluated. All Questions and Concerns Addressed and Answered  Discussed diagnosis and treatment with patient  Wound Flushed w/ normal saline; betadine / DSD / Kerlix / ACE; q24   Local Wound Care; As Stated Above; q24  Stable from podiatry standpoint; follow up with Dr. Tate at [address] [place] [address] [facility] Clinic a week post discharge for re-eval;  Weight bearing status; WBAT BL feet;       Podiatry       
  Patient is seen and examined at the bed side, is afebrile. The Right foot swelling and redness has improved.       REVIEW OF SYSTEMS: All other review systems are negative      ALLERGIES: No Known Allergies        Vital Signs Last 24 Hrs  T(C): 36.8 (30 Jul 2021 05:00), Max: 37.6 (29 Jul 2021 13:39)  T(F): 98.3 (30 Jul 2021 05:00), Max: 99.6 (29 Jul 2021 13:39)  HR: 86 (30 Jul 2021 05:00) (85 - 86)  BP: 126/59 (30 Jul 2021 05:00) (126/59 - 147/71)  BP(mean): --  RR: 16 (30 Jul 2021 05:00) (16 - 16)  SpO2: --      PHYSICAL EXAM:  GENERAL: Not in distress   CHEST/LUNG:  Not using accessory muscles   HEART: s1 and s2 present  ABDOMEN:  Nontender and  Nondistended  EXTREMITIES: No pedal  edema  CNS: Awake and Alert      LABS:                        12.4   6.52  )-----------( 320      ( 30 Jul 2021 07:11 )             37.0       07-30    139  |  105  |  9<L>  ----------------------------<  93  4.3   |  24  |  0.6<L>    Ca    9.0      30 Jul 2021 07:11      Vancomycin Level, Trough -Pre 4th Dose, order if dosed q6/8/12h (07.28.21 @ 21:20)   Vancomycin Level, Trough: 16.6:       MEDICATIONS  (STANDING):  chlorhexidine 4% Liquid 1 Application(s) Topical <User Schedule>  enoxaparin Injectable 40 milliGRAM(s) SubCutaneous daily  vancomycin  IVPB 750 milliGRAM(s) IV Intermittent every 12 hours    MEDICATIONS  (PRN):  acetaminophen   Tablet .. 650 milliGRAM(s) Oral every 6 hours PRN Mild Pain (1 - 3)  acetaminophen   Tablet .. 650 milliGRAM(s) Oral every 6 hours PRN Temp greater or equal to 38C (100.4F)        RADIOLOGY & ADDITIONAL TESTS:    < from: Xray Foot AP + Lateral + Oblique, Right (07.27.21 @ 16:40) >  Dorsal foot soft tissue swelling. No evidence of acute fracture or dislocation.        MICROBIOLOGY DATA:    MRSA/MSSA PCR (07.28.21 @ 13:00)   MRSA PCR Result.: Negative:    COVID-19 Wally Domain Antibody (07.28.21 @ 06:54)   COVID-19 Wally Domain Antibody Result: >250.00    Culture - Blood (07.27.21 @ 17:00)   Specimen Source: .Blood Blood   Culture Results: No growth to date.     Culture - Blood (07.27.21 @ 17:00)   Specimen Source: .Blood Blood   Culture Results: No growth to date.     COVID-19 PCR (07.27.21 @ 14:20)   COVID-19 PCR: NotDetec:         
CC.  Right foot cellulitis  HPI.  Patient reports right foot swelling, and erythema are improving  afebrile  Offers no other complaints              Constitutional: No fever, fatigue or weight loss.  Skin: No rash.  Eyes: No recent vision problems or eye pain.  ENT: No congestion, ear pain, or sore throat.  Endocrine: No thyroid problems.  Cardiovascular: No chest pain or palpation.  Respiratory: No cough, shortness of breath, congestion, or wheezing.  Gastrointestinal: No abdominal pain, nausea, vomiting, or diarrhea.  Genitourinary: No dysuria.  Musculoskeletal: No joint swelling.  Neurologic: No headache.      Vital Signs Last 24 Hrs  T(C): 37 (29 Jul 2021 05:00), Max: 37.6 (28 Jul 2021 21:00)  T(F): 98.6 (29 Jul 2021 05:00), Max: 99.6 (28 Jul 2021 21:00)  HR: 86 (29 Jul 2021 05:00) (86 - 88)  BP: 122/68 (29 Jul 2021 05:00) (122/68 - 129/76)  BP(mean): --  RR: 16 (29 Jul 2021 05:00) (16 - 16)  SpO2: --        PHYSICAL EXAM-  GENERAL: NAD,   HEAD:  Atraumatic, Normocephalic  EYES: EOMI, PERRLA, conjunctiva and sclera clear  NECK: Supple, No JVD, Normal thyroid  NERVOUS SYSTEM:  Alert & Oriented X3, Moving all extremities  CHEST/LUNG: Clear to percussion bilaterally; No rales, rhonchi, wheezing, or rubs  HEART: Regular rate and rhythm; No murmurs, rubs, or gallops  ABDOMEN: Soft, Nontender, Nondistended; Bowel sounds present  EXTREMITIES:  area of erythema with swelling, and less tenderness around the toes of the right foot.  Multiple blisters  SKIN: No rashes or lesions                               12.4   6.08  )-----------( 304      ( 29 Jul 2021 07:11 )             37.1     07-29    137  |  104  |  8<L>  ----------------------------<  143<H>  4.3   |  21  |  0.6<L>    Ca    8.9      29 Jul 2021 07:11  Mg     2.1     07-27    TPro  7.4  /  Alb  4.7  /  TBili  0.5  /  DBili  x   /  AST  22  /  ALT  24  /  AlkPhos  78  07-27                        MEDICATIONS  (STANDING):  chlorhexidine 4% Liquid 1 Application(s) Topical <User Schedule>  enoxaparin Injectable 40 milliGRAM(s) SubCutaneous daily  vancomycin  IVPB 1000 milliGRAM(s) IV Intermittent two times a day    MEDICATIONS  (PRN):  acetaminophen   Tablet .. 650 milliGRAM(s) Oral every 6 hours PRN Mild Pain (1 - 3)  acetaminophen   Tablet .. 650 milliGRAM(s) Oral every 6 hours PRN Temp greater or equal to 38C (100.4F)          Imaging Personally Reviewed:     [x ] YES  [ ] NO    Consultant(s) Notes Reviewed:  [x ] YES  [ ] NO    Care Discussed with Consultants/Other Providers [x ] YES  [ ] No medical contraindication for discharge  
CC.  Right foot cellulitis  HPI.  Patient reports right foot swelling, and erythema are improving  afebrile  Offers no other complaints              Constitutional: No fever, fatigue or weight loss.  Skin: No rash.  Eyes: No recent vision problems or eye pain.  ENT: No congestion, ear pain, or sore throat.  Endocrine: No thyroid problems.  Cardiovascular: No chest pain or palpation.  Respiratory: No cough, shortness of breath, congestion, or wheezing.  Gastrointestinal: No abdominal pain, nausea, vomiting, or diarrhea.  Genitourinary: No dysuria.  Musculoskeletal: No joint swelling.  Neurologic: No headache.      Vital Signs Last 24 Hrs  T(C): 37.1 (07-28-21 @ 06:20), Max: 38.3 (07-28-21 @ 05:00)  T(F): 98.8 (07-28-21 @ 06:20), Max: 101 (07-28-21 @ 05:00)  HR: 103 (07-28-21 @ 05:00) (97 - 103)  BP: 132/64 (07-28-21 @ 05:00) (132/64 - 143/96)  BP(mean): --  RR: 16 (07-28-21 @ 05:00) (16 - 18)  SpO2: 99% (07-27-21 @ 16:02) (99% - 99%)        PHYSICAL EXAM-  GENERAL: NAD,   HEAD:  Atraumatic, Normocephalic  EYES: EOMI, PERRLA, conjunctiva and sclera clear  NECK: Supple, No JVD, Normal thyroid  NERVOUS SYSTEM:  Alert & Oriented X3, Moving all extremities  CHEST/LUNG: Clear to percussion bilaterally; No rales, rhonchi, wheezing, or rubs  HEART: Regular rate and rhythm; No murmurs, rubs, or gallops  ABDOMEN: Soft, Nontender, Nondistended; Bowel sounds present  EXTREMITIES:  area of erythema with swelling, and less tenderness around the toes of the right foot.  Multiple blisters  SKIN: No rashes or lesions                                  12.8   8.98  )-----------( 288      ( 28 Jul 2021 06:54 )             39.0     07-28    140  |  105  |  6<L>  ----------------------------<  143<H>  4.3   |  21  |  0.7    Ca    9.2      28 Jul 2021 06:54  Mg     2.1     07-27    TPro  7.4  /  Alb  4.7  /  TBili  0.5  /  DBili  x   /  AST  22  /  ALT  24  /  AlkPhos  78  07-27                    MEDICATIONS  (STANDING):  chlorhexidine 4% Liquid 1 Application(s) Topical <User Schedule>  enoxaparin Injectable 40 milliGRAM(s) SubCutaneous daily  vancomycin  IVPB 1000 milliGRAM(s) IV Intermittent two times a day    MEDICATIONS  (PRN):  acetaminophen   Tablet .. 650 milliGRAM(s) Oral every 6 hours PRN Mild Pain (1 - 3)  acetaminophen   Tablet .. 650 milliGRAM(s) Oral every 6 hours PRN Temp greater or equal to 38C (100.4F)          Imaging Personally Reviewed:     [x ] YES  [ ] NO    Consultant(s) Notes Reviewed:  [x ] YES  [ ] NO    Care Discussed with Consultants/Other Providers [x ] YES  [ ] No medical contraindication for discharge

## 2021-07-30 NOTE — DISCHARGE NOTE NURSING/CASE MANAGEMENT/SOCIAL WORK - PATIENT PORTAL LINK FT
You can access the FollowMyHealth Patient Portal offered by [facility] by registering at the following website: [email or web address]. By joining [employer]’s FollowMyHealth portal, you will also be able to view your health information using other applications (apps) compatible with our system.

## 2021-07-30 NOTE — DISCHARGE NOTE PROVIDER - NSDCMRMEDTOKEN_GEN_ALL_CORE_FT
Bactrim  mg-160 mg oral tablet: 1 tab(s) orally every 12 hours  mupirocin 2% topical cream:   Valtrex 1 g oral tablet: 1 tab(s) orally 2 times a day   acetaminophen 325 mg oral tablet: 2 tab(s) orally every 6 hours, As needed, Mild Pain (1 - 3)  Augmentin 875 mg-125 mg oral tablet: 1 tab(s) orally every 12 hours   doxycycline hyclate 100 mg oral tablet: 1 tab(s) orally 2 times a day

## 2021-07-30 NOTE — DISCHARGE NOTE PROVIDER - NSDCCPCAREPLAN_GEN_ALL_CORE_FT
PRINCIPAL DISCHARGE DIAGNOSIS  Diagnosis: Cellulitis  Assessment and Plan of Treatment: - you were admitted to medicine and treated with IV antibiotics  - please continue local would care with Xeroform / DSD / Kerlix once a day  - continue medications as recommended  - follow up with Dr. Tate in 1 week for reassessment      SECONDARY DISCHARGE DIAGNOSES  Diagnosis: Bullous eruption  Assessment and Plan of Treatment: continue wound care and  follow up with podiatry as outpateint

## 2021-08-02 LAB
CULTURE RESULTS: SIGNIFICANT CHANGE UP
CULTURE RESULTS: SIGNIFICANT CHANGE UP
SPECIMEN SOURCE: SIGNIFICANT CHANGE UP
SPECIMEN SOURCE: SIGNIFICANT CHANGE UP

## 2021-08-05 DIAGNOSIS — Z79.2 LONG TERM (CURRENT) USE OF ANTIBIOTICS: ICD-10-CM
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
## 2021-08-05 DIAGNOSIS — S90.424A BLISTER (NONTHERMAL), RIGHT LESSER TOE(S), INITIAL ENCOUNTER: ICD-10-CM

## 2021-08-05 DIAGNOSIS — L13.9 BULLOUS DISORDER, UNSPECIFIED: ICD-10-CM

## 2021-08-05 DIAGNOSIS — L29.9 PRURITUS, UNSPECIFIED: ICD-10-CM

## 2021-08-05 DIAGNOSIS — L03.115 CELLULITIS OF RIGHT LOWER LIMB: ICD-10-CM

## 2021-08-05 DIAGNOSIS — Z92.22 PERSONAL HISTORY OF MONOCLONAL DRUG THERAPY: ICD-10-CM
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
## 2022-03-17 PROBLEM — Z00.00 ENCOUNTER FOR PREVENTIVE HEALTH EXAMINATION: Status: ACTIVE | Noted: 2022-03-17

## 2022-03-23 ENCOUNTER — APPOINTMENT (OUTPATIENT)
Dept: PEDIATRIC ALLERGY IMMUNOLOGY | Facility: CLINIC | Age: 57
End: 2022-03-23
Payer: COMMERCIAL

## 2022-03-23 ENCOUNTER — TRANSCRIPTION ENCOUNTER (OUTPATIENT)
Age: 57
End: 2022-03-23

## 2022-03-23 VITALS — WEIGHT: 160 LBS | HEIGHT: 62 IN | BODY MASS INDEX: 29.44 KG/M2

## 2022-03-23 DIAGNOSIS — J45.20 MILD INTERMITTENT ASTHMA, UNCOMPLICATED: ICD-10-CM

## 2022-03-23 PROCEDURE — 99213 OFFICE O/P EST LOW 20 MIN: CPT

## 2022-03-23 NOTE — ASSESSMENT
[FreeTextEntry1] : The patient is a 56 year old female with asthma and seasonal allergies. She is doing well.  I have advised her to take medications for symptomatic relief, I recommended Albuterol MDI for any wheezing and I will follow her as needed.

## 2022-03-23 NOTE — HISTORY OF PRESENT ILLNESS
[None] : The patient is currently asymptomatic [de-identified] : CHARLES MOORE is a 56 year  old female with a history of asthma and seasonal allergies. Two months ago she had mild wheezing episode, responded well to albuterol inhaler. Otherwise overall she is doing well. SInce Covid started her symptoms are much less severe. No history of any other health problems.

## 2022-03-28 ENCOUNTER — TRANSCRIPTION ENCOUNTER (OUTPATIENT)
Age: 57
End: 2022-03-28

## 2022-04-27 ENCOUNTER — RX RENEWAL (OUTPATIENT)
Age: 57
End: 2022-04-27

## 2022-06-10 ENCOUNTER — OUTPATIENT (OUTPATIENT)
Dept: OUTPATIENT SERVICES | Facility: HOSPITAL | Age: 57
LOS: 1 days | Discharge: HOME | End: 2022-06-10
Payer: COMMERCIAL

## 2022-06-10 DIAGNOSIS — Z12.31 ENCOUNTER FOR SCREENING MAMMOGRAM FOR MALIGNANT NEOPLASM OF BREAST: ICD-10-CM

## 2022-06-10 DIAGNOSIS — R92.2 INCONCLUSIVE MAMMOGRAM: ICD-10-CM

## 2022-06-10 PROCEDURE — 76641 ULTRASOUND BREAST COMPLETE: CPT | Mod: 26,50

## 2022-06-10 PROCEDURE — 77067 SCR MAMMO BI INCL CAD: CPT | Mod: 26

## 2022-06-10 PROCEDURE — 77063 BREAST TOMOSYNTHESIS BI: CPT | Mod: 26

## 2022-07-07 ENCOUNTER — EMERGENCY (EMERGENCY)
Facility: HOSPITAL | Age: 57
LOS: 0 days | Discharge: HOME | End: 2022-07-07
Attending: EMERGENCY MEDICINE | Admitting: EMERGENCY MEDICINE

## 2022-07-07 VITALS
SYSTOLIC BLOOD PRESSURE: 139 MMHG | HEART RATE: 74 BPM | OXYGEN SATURATION: 98 % | RESPIRATION RATE: 20 BRPM | DIASTOLIC BLOOD PRESSURE: 78 MMHG

## 2022-07-07 VITALS
RESPIRATION RATE: 20 BRPM | DIASTOLIC BLOOD PRESSURE: 100 MMHG | HEIGHT: 62 IN | WEIGHT: 136.91 LBS | HEART RATE: 89 BPM | OXYGEN SATURATION: 96 % | TEMPERATURE: 98 F | SYSTOLIC BLOOD PRESSURE: 148 MMHG

## 2022-07-07 DIAGNOSIS — M54.9 DORSALGIA, UNSPECIFIED: ICD-10-CM

## 2022-07-07 DIAGNOSIS — Z86.79 PERSONAL HISTORY OF OTHER DISEASES OF THE CIRCULATORY SYSTEM: ICD-10-CM

## 2022-07-07 DIAGNOSIS — S32.029A UNSPECIFIED FRACTURE OF SECOND LUMBAR VERTEBRA, INITIAL ENCOUNTER FOR CLOSED FRACTURE: ICD-10-CM
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
## 2022-07-07 DIAGNOSIS — Z79.82 LONG TERM (CURRENT) USE OF ASPIRIN: ICD-10-CM

## 2022-07-07 DIAGNOSIS — E78.5 HYPERLIPIDEMIA, UNSPECIFIED: ICD-10-CM
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
## 2022-07-07 LAB
ALBUMIN SERPL ELPH-MCNC: 5.2 G/DL — SIGNIFICANT CHANGE UP (ref 3.5–5.2)
ALP SERPL-CCNC: 96 U/L — SIGNIFICANT CHANGE UP (ref 30–115)
ALT FLD-CCNC: 28 U/L — SIGNIFICANT CHANGE UP (ref 0–41)
ANION GAP SERPL CALC-SCNC: 11 MMOL/L — SIGNIFICANT CHANGE UP (ref 7–14)
AST SERPL-CCNC: 40 U/L — SIGNIFICANT CHANGE UP (ref 0–41)
BASOPHILS # BLD AUTO: 0.05 K/UL — SIGNIFICANT CHANGE UP (ref 0–0.2)
BASOPHILS NFR BLD AUTO: 0.5 % — SIGNIFICANT CHANGE UP (ref 0–1)
BILIRUB SERPL-MCNC: 0.9 MG/DL — SIGNIFICANT CHANGE UP (ref 0.2–1.2)
BUN SERPL-MCNC: 11 MG/DL — SIGNIFICANT CHANGE UP (ref 10–20)
CALCIUM SERPL-MCNC: 9.9 MG/DL — SIGNIFICANT CHANGE UP (ref 8.5–10.1)
CHLORIDE SERPL-SCNC: 102 MMOL/L — SIGNIFICANT CHANGE UP (ref 98–110)
CO2 SERPL-SCNC: 26 MMOL/L — SIGNIFICANT CHANGE UP (ref 17–32)
CREAT SERPL-MCNC: 0.8 MG/DL — SIGNIFICANT CHANGE UP (ref 0.7–1.5)
EGFR: 86 ML/MIN/1.73M2 — SIGNIFICANT CHANGE UP
EOSINOPHIL # BLD AUTO: 0.1 K/UL — SIGNIFICANT CHANGE UP (ref 0–0.7)
EOSINOPHIL NFR BLD AUTO: 1.1 % — SIGNIFICANT CHANGE UP (ref 0–8)
GLUCOSE SERPL-MCNC: 95 MG/DL — SIGNIFICANT CHANGE UP (ref 70–99)
HCT VFR BLD CALC: 42.9 % — SIGNIFICANT CHANGE UP (ref 37–47)
HGB BLD-MCNC: 14.1 G/DL — SIGNIFICANT CHANGE UP (ref 12–16)
IMM GRANULOCYTES NFR BLD AUTO: 0.4 % — HIGH (ref 0.1–0.3)
LYMPHOCYTES # BLD AUTO: 2 K/UL — SIGNIFICANT CHANGE UP (ref 1.2–3.4)
LYMPHOCYTES # BLD AUTO: 21.4 % — SIGNIFICANT CHANGE UP (ref 20.5–51.1)
MCHC RBC-ENTMCNC: 30.7 PG — SIGNIFICANT CHANGE UP (ref 27–31)
MCHC RBC-ENTMCNC: 32.9 G/DL — SIGNIFICANT CHANGE UP (ref 32–37)
MCV RBC AUTO: 93.3 FL — SIGNIFICANT CHANGE UP (ref 81–99)
MONOCYTES # BLD AUTO: 0.55 K/UL — SIGNIFICANT CHANGE UP (ref 0.1–0.6)
MONOCYTES NFR BLD AUTO: 5.9 % — SIGNIFICANT CHANGE UP (ref 1.7–9.3)
NEUTROPHILS # BLD AUTO: 6.6 K/UL — HIGH (ref 1.4–6.5)
NEUTROPHILS NFR BLD AUTO: 70.7 % — SIGNIFICANT CHANGE UP (ref 42.2–75.2)
NRBC # BLD: 0 /100 WBCS — SIGNIFICANT CHANGE UP (ref 0–0)
PLATELET # BLD AUTO: 318 K/UL — SIGNIFICANT CHANGE UP (ref 130–400)
POTASSIUM SERPL-MCNC: 3.8 MMOL/L — SIGNIFICANT CHANGE UP (ref 3.5–5)
POTASSIUM SERPL-SCNC: 3.8 MMOL/L — SIGNIFICANT CHANGE UP (ref 3.5–5)
PROT SERPL-MCNC: 8.1 G/DL — HIGH (ref 6–8)
RBC # BLD: 4.6 M/UL — SIGNIFICANT CHANGE UP (ref 4.2–5.4)
RBC # FLD: 13.6 % — SIGNIFICANT CHANGE UP (ref 11.5–14.5)
SODIUM SERPL-SCNC: 139 MMOL/L — SIGNIFICANT CHANGE UP (ref 135–146)
WBC # BLD: 9.34 K/UL — SIGNIFICANT CHANGE UP (ref 4.8–10.8)
WBC # FLD AUTO: 9.34 K/UL — SIGNIFICANT CHANGE UP (ref 4.8–10.8)

## 2022-07-07 PROCEDURE — 71260 CT THORAX DX C+: CPT | Mod: 26,MA

## 2022-07-07 PROCEDURE — 74177 CT ABD & PELVIS W/CONTRAST: CPT | Mod: 26,MA

## 2022-07-07 PROCEDURE — 99285 EMERGENCY DEPT VISIT HI MDM: CPT

## 2022-07-07 PROCEDURE — 72170 X-RAY EXAM OF PELVIS: CPT | Mod: 26

## 2022-07-07 PROCEDURE — 71045 X-RAY EXAM CHEST 1 VIEW: CPT | Mod: 26

## 2022-07-07 RX ORDER — ACETAMINOPHEN 500 MG
2 TABLET ORAL
Qty: 40 | Refills: 0
Start: 2022-07-07 | End: 2022-07-11

## 2022-07-07 RX ORDER — MORPHINE SULFATE 50 MG/1
2 CAPSULE, EXTENDED RELEASE ORAL ONCE
Refills: 0 | Status: DISCONTINUED | OUTPATIENT
Start: 2022-07-07 | End: 2022-07-07

## 2022-07-07 RX ORDER — OXYCODONE AND ACETAMINOPHEN 5; 325 MG/1; MG/1
1 TABLET ORAL ONCE
Refills: 0 | Status: DISCONTINUED | OUTPATIENT
Start: 2022-07-07 | End: 2022-07-07

## 2022-07-07 RX ORDER — IBUPROFEN 200 MG
1 TABLET ORAL
Qty: 15 | Refills: 0
Start: 2022-07-07 | End: 2022-07-11

## 2022-07-07 RX ADMIN — MORPHINE SULFATE 2 MILLIGRAM(S): 50 CAPSULE, EXTENDED RELEASE ORAL at 09:50

## 2022-07-07 RX ADMIN — MORPHINE SULFATE 2 MILLIGRAM(S): 50 CAPSULE, EXTENDED RELEASE ORAL at 12:15

## 2022-07-07 RX ADMIN — MORPHINE SULFATE 2 MILLIGRAM(S): 50 CAPSULE, EXTENDED RELEASE ORAL at 10:10

## 2022-07-07 NOTE — ED PROVIDER NOTE - OBJECTIVE STATEMENT
58 y/o F w pMHx of HLD and mitral valve disorder (on aspirin and metoprolol) presents to ED s/p accidental trip and fall in backyard landing on back PTA. Pt is unable to move her back and has significant back pain. Denies LOC, head injury, AC use, f/c, n/v, abd pain, extremity pain, numbness, weakness, tingling. 56 y/o F w PMHx of HLD and mitral valve disorder (on aspirin and metoprolol) presents to ED s/p accidental trip and fall in backyard landing on back PTA. Pt is unable to move her back and has significant back pain. Denies LOC, head injury, AC use, f/c, n/v, abd pain, extremity pain, numbness, weakness, tingling.

## 2022-07-07 NOTE — ED PROVIDER NOTE - PROVIDER TOKENS
PROVIDER:[TOKEN:[[id number]],FOLLOWUP:[Routine]],PROVIDER:[TOKEN:[[id number]],FOLLOWUP:[1-3 Days]]

## 2022-07-07 NOTE — ED PROVIDER NOTE - PROGRESS NOTE DETAILS
Authored by Dr. Matias: Pt informed of rectosigmoid junction narrowing and recommend colonoscopy, pt notes she had 2 years ago w nl results, informed to f/u with GI.

## 2022-07-07 NOTE — ED PROVIDER NOTE - NSFOLLOWUPINSTRUCTIONS_ED_ALL_ED_FT
Our Emergency Department Referral Coordinators will be reaching out ot you in the next 24-48 hours from 9:00am to 5:00pm (Monday to Friday) with a follow up appointment. Please expect a phone call from the hospital in that time frame. If you do not receive a call or if you have any questions or concerns, you can reach them at [phone] or [phone].          Back Pain    Back pain is very common in adults. The cause of back pain is rarely dangerous and the pain often gets better over time. The cause of your back pain may not be known and may include strain of muscles or ligaments, degeneration of the spinal disks, or arthritis. Occasionally the pain may radiate down your leg(s). Over-the-counter medicines to reduce pain and inflammation are often the most helpful. Stretching and remaining active frequently helps the healing process.     SEEK IMMEDIATE MEDICAL CARE IF YOU HAVE ANY OF THE FOLLOWING SYMPTOMS: bowel or bladder control problems, unusual weakness or numbness in your arms or legs, nausea or vomiting, abdominal pain, fever, dizziness/lightheadedness.      Vertebral Compression Fracture    AMBULATORY CARE:    A vertebral compression fracture (VCF) is a collapse or breakdown in a bone in your spine. Compression fractures happen when there is too much pressure on the vertebra. VCFs most often occur in the thoracic (middle) and lumbar (lower) areas of your spine. Fractures may be mild to severe.       Signs and symptoms: You may not have any signs or symptoms with a mild VCF. You may have any of the following with a more severe fracture:  •Sudden, severe, and sharp back pain  •Back pain that gets worse when you stand or walk  •Muscle spasms in your back  •Problems urinating or having bowel movements  •Sudden weakness in your arms or legs  Call your local emergency number (911 in the [place]) if:   •You feel lightheaded, short of breath, and have chest pain.  •You cough up blood.  •You suddenly cannot feel your legs.  •You suddenly have trouble moving your arms or legs.    Seek care immediately if:   •Your arm or leg feels warm, tender, and painful. It may look swollen and red.  •You have new problems urinating or having bowel movements.  •You have severe pain in your back after falling, bending forward, sneezing, or coughing strongly.    Call your doctor or orthopedist if:   •You cannot sleep or rest because of back pain.  •You have pain or swelling in your back that is getting worse, or does not go away.  •You have questions or concerns about your condition or care.    Treatment may include any of the following, depending on how severe the fracture is:   •Bed rest may be needed for a mild fracture.  •A back brace may be needed for 8 to 12 weeks. A brace may decrease your pain, and help your vertebrae heal.  •A cane or walker can help you keep your balance when you walk. This helps prevent a fall that could cause more injury.  •Medicines may be given for pain. Bisphosphonates and calcitonin are medicines to help your bones get stronger. They can decrease the pain of a VCF caused by osteoporosis, and decrease your risk for another fracture.  •Physical or occupational therapy may be recommended. A physical therapist teaches you exercises to help improve movement and strength, and to decrease pain. An occupational therapist teaches you skills to help with your daily activities.  •Surgery may be needed if your pain, weakness, or numbness does not go away with other treatment. Surgery may make your spine more stable, and help decrease pressure on your spinal nerves caused by the fracture.    Heat and ice:   •Apply ice on your back for 15 to 20 minutes every hour or as directed. Use an ice pack, or put crushed ice in a plastic bag. Cover it with a towel before you apply it. Ice helps prevent tissue damage and decreases swelling and pain.  •Apply heat on your back for 20 to 30 minutes every 2 hours for as many days as directed. Heat helps decrease pain and muscle spasms.      Activity:   •Avoid activities that may make the pain worse, such as picking up heavy objects. When the pain decreases, begin normal, slow movements as directed by your healthcare provider. Your healthcare provider may have you do weight-bearing exercises such as walking. You may also do non-weight-bearing exercises such as swimming and bicycling.  •You may need to use a walker or cane. Ask your healthcare provider for more information about how to use a cane or a walker.  •When you  objects, bend at the hips and knees. Never bend from the waist only. Use bent knees and your leg muscles as you lift the object. While you lift the object, keep it close to your chest. Try not to twist or lift anything above your waist.    Physical and occupational therapy: Your healthcare provider may recommend you start or continue one or both types of therapy. A physical therapist teaches you exercises to help improve movement and strength, and to decrease pain. An occupational therapist teaches you skills to help with your daily activities.    Manage pain during sleep:   •Do not sleep on a waterbed. Waterbeds do not provide good back support.  •Sleep on a firm mattress. You may also put a ½ to 1-inch piece of plywood between the mattress and box spring.  •Sleep on your back with a pillow under your knees. This will decrease pressure on your back. You may also sleep on your side with 1 or both of your knees bent and a pillow between them. It may also be helpful to sleep on your stomach with a pillow under you at waist level.    Follow up with your doctor or orthopedist as directed: You may need to return for x-rays or other tests. Write down your questions so you remember to ask them during your visits.

## 2022-07-07 NOTE — ED PROVIDER NOTE - CARE PROVIDER_API CALL
Caity Carrero)  Gastroenterology  [address] [place] [address]  Phone: [phone]  Fax: [phone]  Follow Up Time: Routine    Benjamin Reyna; [facility])  [facility] Neurosurgery Surgery  [address] [place] [address]  Phone: [phone]  Fax: [phone]  Follow Up Time: 1-3 Days

## 2022-07-07 NOTE — ED PROVIDER NOTE - ATTENDING CONTRIBUTION TO CARE
57-year-old female with history of dyslipidemia mitral valve disorder on aspirin and metoprolol here evaluation of fall.  Patient had a mechanical fall from standing landing with her back against a pot.  Patient did not hit her head.  The fall occurred prior to arrival and patient is having pain to her lower back.  She denies any shortness of breath rib pain.  Agree with above exam  Impression  Patient with midline lumbar pain and right lumbar ecchymosis and abrasion.  CT imaging done to exclude acute process with CT imaging demonstrating mild compression deformity of L2.  Patient is able to ambulate in ED will give referral to neurosurgery.  Patient was made aware of the incidental rectosigmoid junction abnormality.  Patient is aware it needs outpatient follow-up for this.  Patient has no other further trauma work-up as there is no other traumatic injuries present.

## 2022-07-07 NOTE — ED PROVIDER NOTE - PHYSICAL EXAMINATION
VITAL SIGNS: I have reviewed nursing notes and confirm.  CONSTITUTIONAL: non-toxic, well appearing  SKIN: no rash, no petechiae.  EYES: pink conjunctiva, anicteric  ENT: tongue midline, no exudates, MMM  NECK: Supple; no meningismus, no JVD  CARD: RRR, no murmurs, equal radial pulses bilaterally 2+  RESP: CTAB, no respiratory distress  ABD: Soft, non-tender, non-distended, no peritoneal signs  BACK: + lower lumbar spinal ttp and lower R paraspinal ttp with ecchymosis and small abrasion, limited flexion and extension secondary to pain  EXT: No edema. No calves tenderness  NEURO: Alert, oriented, no focal deficits, no numbness, no tingling

## 2022-07-07 NOTE — ED PROVIDER NOTE - CARE PROVIDERS DIRECT ADDRESSES
[email or web address]

## 2022-07-07 NOTE — ED PROVIDER NOTE - NS ED ROS FT
Review of Systems    Constitutional: (-) fever  Cardiovascular: (-) chest pain, (-) syncope  Respiratory: (-) cough, (-) shortness of breath  Gastrointestinal: (-) vomiting, (-) diarrhea, (-) abdominal pain  Musculoskeletal: (-) neck pain, + back pain, (-) joint pain  Integumentary: (-) rash, (-) edema  Neurological: (-) headache, (-) altered mental status    Except as documented in the HPI, all other systems are negative.

## 2022-07-07 NOTE — ED PROVIDER NOTE - PATIENT PORTAL LINK FT
You can access the FollowMyHealth Patient Portal offered by [facility] by registering at the following website: [email or web address]. By joining [employer]’s FollowMyHealth portal, you will also be able to view your health information using other applications (apps) compatible with our system.

## 2022-07-07 NOTE — ED PROVIDER NOTE - CLINICAL SUMMARY MEDICAL DECISION MAKING FREE TEXT BOX
Patient with midline lumbar pain and right lumbar ecchymosis and abrasion.  CT imaging done to exclude acute process with CT imaging demonstrating mild compression deformity of L2.  Patient is able to ambulate in ED will give referral to neurosurgery.  Patient was made aware of the incidental rectosigmoid junction abnormality.  Patient is aware it needs outpatient follow-up for this.  Patient has no other further trauma work-up as there is no other traumatic injuries present.

## 2022-07-21 ENCOUNTER — APPOINTMENT (OUTPATIENT)
Dept: NEUROSURGERY | Facility: CLINIC | Age: 57
End: 2022-07-21

## 2022-07-21 VITALS — WEIGHT: 140 LBS | BODY MASS INDEX: 25.76 KG/M2 | HEIGHT: 62 IN

## 2022-07-21 DIAGNOSIS — Z78.9 OTHER SPECIFIED HEALTH STATUS: ICD-10-CM

## 2022-07-21 DIAGNOSIS — S32.000A WEDGE COMPRESSION FRACTURE OF UNSPECIFIED LUMBAR VERTEBRA, INITIAL ENCOUNTER FOR CLOSED FRACTURE: ICD-10-CM

## 2022-07-21 PROCEDURE — 99204 OFFICE O/P NEW MOD 45 MIN: CPT

## 2022-07-21 RX ORDER — ALBUTEROL SULFATE 90 UG/1
108 (90 BASE) INHALANT RESPIRATORY (INHALATION)
Qty: 1 | Refills: 0 | Status: ACTIVE | COMMUNITY
Start: 2022-03-25

## 2022-07-26 PROBLEM — S32.000A LUMBAR COMPRESSION FRACTURE: Status: ACTIVE | Noted: 2022-07-21

## 2022-07-26 NOTE — PLAN
[FreeTextEntry1] : Given given patient proving symptoms no need for further management or imaging. She may follow-up as needed

## 2022-07-26 NOTE — HISTORY OF PRESENT ILLNESS
[FreeTextEntry1] : s/p fall- compression fx [de-identified] : This 58 y/o F w PMHx of HLD and mitral valve disorder (on aspirin and metoprolol) presents to ED s/p accidental trip and fall in backyard landing on back recently. FOund on CT abd/pel/chest to have L2 compression fracture. \par Today patient reports that she is feeling much better, however feels stiff, is wearing an abdominal binder which helps. Denies any radicular leg pain numbness and tingling.

## 2022-07-26 NOTE — END OF VISIT
[FreeTextEntry3] : ATTENDING ADDENDUM\par Patient seen and examined. Clinical history, imaging, and electronic medical record reviewed independently by me. Assessment and plan per ACP note above.\par \par Neurologic Exam/Physical Exam\par Alert & Oriented x 3\par CN2-12 grossly intact\par Sensation intact to light touch, pain/temperature intact\par Vibration/proprioception Intact\par Motor strength 5/5 in all extremities\par 2+ patellar, 2+ brachioradialis \par

## 2022-08-15 ENCOUNTER — APPOINTMENT (OUTPATIENT)
Dept: GASTROENTEROLOGY | Facility: CLINIC | Age: 57
End: 2022-08-15

## 2022-09-09 ENCOUNTER — OUTPATIENT (OUTPATIENT)
Dept: OUTPATIENT SERVICES | Facility: HOSPITAL | Age: 57
LOS: 1 days | Discharge: HOME | End: 2022-09-09

## 2022-09-09 DIAGNOSIS — I20.9 ANGINA PECTORIS, UNSPECIFIED: ICD-10-CM

## 2022-09-09 PROCEDURE — 75574 CT ANGIO HRT W/3D IMAGE: CPT | Mod: 26

## 2022-09-26 ENCOUNTER — APPOINTMENT (OUTPATIENT)
Dept: GASTROENTEROLOGY | Facility: CLINIC | Age: 57
End: 2022-09-26

## 2022-09-26 VITALS — BODY MASS INDEX: 27.57 KG/M2 | WEIGHT: 149.8 LBS | HEIGHT: 62 IN

## 2022-09-26 DIAGNOSIS — Z86.39 PERSONAL HISTORY OF OTHER ENDOCRINE, NUTRITIONAL AND METABOLIC DISEASE: ICD-10-CM

## 2022-09-26 DIAGNOSIS — Z86.79 PERSONAL HISTORY OF OTHER DISEASES OF THE CIRCULATORY SYSTEM: ICD-10-CM

## 2022-09-26 DIAGNOSIS — I20.9 ANGINA PECTORIS, UNSPECIFIED: ICD-10-CM

## 2022-09-26 PROCEDURE — 99204 OFFICE O/P NEW MOD 45 MIN: CPT

## 2022-09-26 RX ORDER — ASPIRIN 81 MG/1
81 TABLET ORAL
Refills: 0 | Status: ACTIVE | COMMUNITY
Start: 2022-09-26

## 2022-09-26 RX ORDER — ACETAMINOPHEN 325 MG/1
325 TABLET ORAL
Qty: 40 | Refills: 0 | Status: DISCONTINUED | COMMUNITY
Start: 2022-07-07

## 2022-09-26 RX ORDER — BISACODYL 5 MG/1
5 TABLET ORAL
Qty: 4 | Refills: 0 | Status: ACTIVE | COMMUNITY
Start: 2022-09-26 | End: 1900-01-01

## 2022-09-26 RX ORDER — CICLOPIROX OLAMINE 7.7 MG/G
0.77 CREAM TOPICAL
Qty: 60 | Refills: 0 | Status: DISCONTINUED | COMMUNITY
Start: 2022-05-03

## 2022-09-26 RX ORDER — METOPROLOL TARTRATE 25 MG/1
25 TABLET, FILM COATED ORAL
Qty: 60 | Refills: 0 | Status: ACTIVE | COMMUNITY
Start: 2022-06-24

## 2022-09-26 RX ORDER — CLOBETASOL PROPIONATE 0.5 MG/ML
0.05 SOLUTION TOPICAL
Qty: 100 | Refills: 0 | Status: DISCONTINUED | COMMUNITY
Start: 2022-05-03

## 2022-09-26 RX ORDER — IBUPROFEN 800 MG/1
TABLET, FILM COATED ORAL
Refills: 0 | Status: DISCONTINUED | COMMUNITY
End: 2022-09-26

## 2022-09-26 RX ORDER — POLYETHYLENE GLYCOL 3350 AND ELECTROLYTES WITH LEMON FLAVOR 236; 22.74; 6.74; 5.86; 2.97 G/4L; G/4L; G/4L; G/4L; G/4L
236 POWDER, FOR SOLUTION ORAL
Qty: 1 | Refills: 0 | Status: ACTIVE | COMMUNITY
Start: 2022-09-26 | End: 1900-01-01

## 2022-09-26 RX ORDER — MULTIVITAMIN
TABLET ORAL
Refills: 0 | Status: ACTIVE | COMMUNITY

## 2022-09-26 RX ORDER — PSYLLIUM HUSK 0.4 G
CAPSULE ORAL
Refills: 0 | Status: ACTIVE | COMMUNITY

## 2022-09-26 RX ORDER — PREDNISONE 20 MG/1
20 TABLET ORAL
Qty: 6 | Refills: 0 | Status: DISCONTINUED | COMMUNITY
Start: 2022-04-26

## 2022-09-26 NOTE — ASSESSMENT
[FreeTextEntry1] : Abnormal findings on CT Scan of Abdomen (07/22) :  questionable narrowing of the rectosigmoid junction with upstream moderate to large stool volume\par \par CBC from 7/22 reviewed, no evidence of Iron Deficiency Anemia\par CT Scan of Abdomen from 7/7/22 reviewed\par Schedule a diagnostic colonoscopy\par Will need Cardiology (Dr. Tab Sargent [phone]) and Neurosurgery Clearance prior to scheduling the procedure.\par All risks and benefits of the colonoscopy were explained to the patient as welll as the Prep.\par RTC after the colonoscopy is done for the results. \par \par

## 2022-09-26 NOTE — HISTORY OF PRESENT ILLNESS
[FreeTextEntry1] : 57 year old female here for initial evaluation of abnormal findings on CT scan of the abdomen done in the ED 7/2022 after a fall. In addition to a vertebral compression fracture of L2, there was questionable narrowing noted near the rectosigmoid junction with upstream moderate to  large volume stool burden and therefore a colonoscopy/sigmoidoscopy was recommended to correlate these findings with. She had a routine colonoscopy done 3 years ago which was normal per pt. She  denied abdominal pain, vomiting, diarrhea, constipation, blood in stools, black stools, heartburn, fevers, chills, weight loss, or other associated complaints. She has a H/O angina and saw a Cardiologist recently; CT Angiography done 9/9/22 was normal. She has not seen the Neurosurgeon since July but she reported significant improvement in her back pain.  [de-identified] : Abdomen 7/2022: questionable narrowing near the rectosigmoid junction with upstream moderate to large volume stool burden, correlate with colonoscopy/sigmoidoscopy

## 2022-09-26 NOTE — PHYSICAL EXAM
[No Edema] : no edema [No CVA Tenderness] : no CVA  tenderness [Abnormal Walk] : normal gait [Normal Color / Pigmentation] : normal skin color and pigmentation [No Focal Deficits] : no focal deficits [Normal] : oriented to person, place, and time

## 2022-09-26 NOTE — END OF VISIT
[] : Fellow [FreeTextEntry3] : Pt seen and examined, agree with findings and plan as noted by Diane D'Amico, PA. Pt found to have possible rectosigmoid narrowing/thickening on recent CT imaging performed after a fall. Pt has no GI complaints or evidence of anemia. Last colonoscopy 3 years ago reportedly normal. Will schedule for colonoscopy pending cardiology and neurology clearance. Pt agreeable with plan.

## 2022-12-27 ENCOUNTER — NON-APPOINTMENT (OUTPATIENT)
Age: 57
End: 2022-12-27

## 2023-01-01 NOTE — ED PROVIDER NOTE - WR ORDER STATUS 1
Performed Resulted PROVIDER:[TOKEN:[[id number]]],FREE:[LAST:[Michael],FIRST:[Radha],PHONE:[phone]],FAX:[phone]],ADDRESS:[[address] [place] [address]]]

## 2023-01-04 ENCOUNTER — RESULT REVIEW (OUTPATIENT)
Age: 58
End: 2023-01-04

## 2023-01-04 ENCOUNTER — OUTPATIENT (OUTPATIENT)
Dept: OUTPATIENT SERVICES | Facility: HOSPITAL | Age: 58
LOS: 1 days | Discharge: HOME | End: 2023-01-04
Payer: COMMERCIAL

## 2023-01-04 ENCOUNTER — TRANSCRIPTION ENCOUNTER (OUTPATIENT)
Age: 58
End: 2023-01-04

## 2023-01-04 VITALS
RESPIRATION RATE: 18 BRPM | HEART RATE: 76 BPM | SYSTOLIC BLOOD PRESSURE: 116 MMHG | DIASTOLIC BLOOD PRESSURE: 67 MMHG | OXYGEN SATURATION: 100 %

## 2023-01-04 VITALS
HEART RATE: 90 BPM | DIASTOLIC BLOOD PRESSURE: 90 MMHG | TEMPERATURE: 97 F | WEIGHT: 136.03 LBS | SYSTOLIC BLOOD PRESSURE: 117 MMHG | HEIGHT: 62 IN

## 2023-01-04 PROCEDURE — 45378 DIAGNOSTIC COLONOSCOPY: CPT

## 2023-01-04 PROCEDURE — 45380 COLONOSCOPY AND BIOPSY: CPT

## 2023-01-04 PROCEDURE — 88305 TISSUE EXAM BY PATHOLOGIST: CPT | Mod: 26

## 2023-01-04 NOTE — H&P PST ADULT - ASSESSMENT
58yo female presents for evaluation of abnormal CT imaging revealing thickening at rectosigmoid. Plan for colonoscopy.

## 2023-01-04 NOTE — H&P PST ADULT - HISTORY OF PRESENT ILLNESS
58yo female presents for evaluation of abnormal CT imaging revealing rectosigmoid thickening. Last colonoscopy in 2019. Pt reports regular bm, denies blood in stools. Plan for colonoscopy.

## 2023-01-04 NOTE — ASU DISCHARGE PLAN (ADULT/PEDIATRIC) - NS MD DC FALL RISK RISK
For information on Fall & Injury Prevention, visit: [email or web address] OR  [email or web address] OR  https://www.cdc.gov/steadi/patient.html

## 2023-01-04 NOTE — ASU PATIENT PROFILE, ADULT - WAS YOUR LAST COVID-19 VACCINE GREATER THAN OR EQUAL TO TWO MONTHS AGO?
Dr Manisha Blackwell, for the DCCV+PM/sotalol admission does the patient needs to stop the anticoagulant? Hannah Hill, can you please check for Sotalol medication price for admission for this patient  Thank you  No

## 2023-01-04 NOTE — ASU DISCHARGE PLAN (ADULT/PEDIATRIC) - CARE PROVIDER_API CALL
Traci Madrigal)  Gastroenterology; Internal Medicine  [address] [place] [address]  Phone: [phone]  Fax: [phone]  Follow Up Time:

## 2023-01-04 NOTE — CHART NOTE - NSCHARTNOTEFT_GEN_A_CORE
PACU ANESTHESIA ADMISSION NOTE      Procedure: Colon Polyps  Post op diagnosis:  Colonoscopy    ____  Intubated  TV:______       Rate: ______      FiO2: ______    _x___  Patent Airway    _x___  Full return of protective reflexes    _x___  Full recovery from anesthesia / back to baseline status    Vitals:  T(C): 97.3F  HR: 79  BP: 112/65  RR: 24  SpO2: 100%    Mental Status:  _x___ Awake   _____ Alert   _____ Drowsy   _____ Sedated    Nausea/Vomiting:  _x___  NO       ______Yes,   See Post - Op Orders         Pain Scale (0-10):  __0___    Treatment: _x___ None    ____ See Post - Op/PCA Orders    Post - Operative Fluids:   __x__ Oral   ____ See Post - Op Orders    Plan: Discharge:   _x___Home       _____Floor     _____Critical Care    _____  Other:_________________    Comments:  No anesthesia issues or complications noted.  Discharge when criteria met.

## 2023-01-04 NOTE — ASU PREOP CHECKLIST - SITE MARKED BY SURGEON
n/a
no fever/no chills/no sweating/no anorexia/no weight gain/no polyphagia/no polyuria/no polydipsia/no fatigue

## 2023-01-05 LAB — SURGICAL PATHOLOGY STUDY: SIGNIFICANT CHANGE UP

## 2023-01-06 DIAGNOSIS — K51.20 ULCERATIVE (CHRONIC) PROCTITIS WITHOUT COMPLICATIONS: ICD-10-CM

## 2023-01-06 DIAGNOSIS — K52.9 NONINFECTIVE GASTROENTERITIS AND COLITIS, UNSPECIFIED: ICD-10-CM

## 2023-01-06 DIAGNOSIS — R93.3 ABNORMAL FINDINGS ON DIAGNOSTIC IMAGING OF OTHER PARTS OF DIGESTIVE TRACT: ICD-10-CM

## 2023-01-06 DIAGNOSIS — K63.5 POLYP OF COLON: ICD-10-CM

## 2023-01-06 DIAGNOSIS — K64.8 OTHER HEMORRHOIDS: ICD-10-CM

## 2023-01-30 ENCOUNTER — APPOINTMENT (OUTPATIENT)
Dept: GASTROENTEROLOGY | Facility: CLINIC | Age: 58
End: 2023-01-30
Payer: COMMERCIAL

## 2023-01-30 PROCEDURE — 99213 OFFICE O/P EST LOW 20 MIN: CPT | Mod: 95

## 2023-01-30 PROCEDURE — 99203 OFFICE O/P NEW LOW 30 MIN: CPT | Mod: 95

## 2023-01-30 NOTE — ASSESSMENT
[FreeTextEntry1] : 57 year old female presents for follow up after colonoscopy performed for abnormal findings on CT scan of the abdomen done in the ED 7/2022 after a fall revealing questionable narrowing noted near the rectosigmoid junction with upstream moderate to large volume stool burden. Colonoscopy on 1/4/23 revealing erythema and small apthous ulcers in rectum, sigmoid polyp, path showing mild nonspecific chronic inflammation at rectum and sigmoid. Pt reporting loose stool after eating sushi 1 month ago. Possible etiologies including infectious/post infectious, IBD/proctitis.\par \par -Recommend check cbc, esr, crp, asca/anca markers\par -Check stool studies including c difficile, GI pcr, culture, ova/parasites, calprotectin\par -Will review findings with pathologist, reported as nonspecific\par -Pending above will determine further mgmt\par \par Follow up 2-4 weeks\par Pt agreeable with plan, advised to contact us if questions/concerns or if new/worsening symptoms\par \par

## 2023-01-30 NOTE — PHYSICAL EXAM
[Alert] : alert [Healthy Appearing] : healthy appearing [Well Developed] : well developed [Normal Affect] : the affect was normal [Normal Mood] : the mood was normal

## 2023-01-30 NOTE — REVIEW OF SYSTEMS
[Negative] : Heme/Lymph [As Noted in HPI] : as noted in HPI [Diarrhea] : diarrhea [Fever] : no fever [Chills] : no chills [Feeling Poorly] : not feeling poorly [Feeling Tired] : not feeling tired [Recent Weight Loss (___ Lbs)] : no recent weight loss [Sore Throat] : no sore throat [Hoarseness] : no hoarseness [Abdominal Pain] : no abdominal pain [Vomiting] : no vomiting [Constipation] : no constipation [Heartburn] : no heartburn [Bleeding] : no bleeding

## 2023-01-30 NOTE — HISTORY OF PRESENT ILLNESS
[Home] : at home, [unfilled] , at the time of the visit. [Medical Office: ([facility])___] : at the medical office located in  [Verbal consent obtained from patient] : the patient, [unfilled] [FreeTextEntry4] : ALESSIO BROOKE [FreeTextEntry1] : 1/4/23 [de-identified] : Abdomen 7/2022: questionable narrowing near the rectosigmoid junction with upstream moderate to large volume stool burden, correlate with colonoscopy/sigmoidoscopy

## 2023-02-03 ENCOUNTER — LABORATORY RESULT (OUTPATIENT)
Age: 58
End: 2023-02-03

## 2023-02-09 NOTE — ED ADULT NURSE NOTE - TEMPLATE LIST FOR HEAD TO TOE ASSESSMENT
February 9, 2023       Travon Mosquera MD  [address] [facility] [address]  Via In Basket      Patient: Gareth Ventura   YOB: 1953   Date of Visit: 2/9/2023       Dear Dr. Mosquera:    Thank you for referring Gareth Ventura to me for evaluation. Below are my notes for this visit with him.    If you have questions, please do not hesitate to call me. I look forward to following your patient along with you.      Sincerely,        [facility] NURSE/LAB        CC: No Recipients      General

## 2023-02-22 ENCOUNTER — NON-APPOINTMENT (OUTPATIENT)
Age: 58
End: 2023-02-22

## 2023-02-22 LAB
BACTERIA STL CULT: NORMAL
C DIFF TOXIN B QL PCR REFLEX: NORMAL
DEPRECATED O AND P PREP STL: NORMAL
GDH ANTIGEN: NOT DETECTED
TOXIN A AND B: NOT DETECTED

## 2023-02-24 ENCOUNTER — APPOINTMENT (OUTPATIENT)
Dept: GASTROENTEROLOGY | Facility: CLINIC | Age: 58
End: 2023-02-24
Payer: COMMERCIAL

## 2023-02-24 DIAGNOSIS — R19.5 OTHER FECAL ABNORMALITIES: ICD-10-CM

## 2023-02-24 DIAGNOSIS — R93.5 ABNORMAL FINDINGS ON DIAGNOSTIC IMAGING OF OTHER ABDOMINAL REGIONS, INCLUDING RETROPERITONEUM: ICD-10-CM

## 2023-02-24 LAB — G LAMBLIA AG STL QL: NORMAL

## 2023-02-24 PROCEDURE — 99213 OFFICE O/P EST LOW 20 MIN: CPT | Mod: 95

## 2023-02-24 PROCEDURE — 99203 OFFICE O/P NEW LOW 30 MIN: CPT | Mod: 95

## 2023-02-24 NOTE — REVIEW OF SYSTEMS
[As Noted in HPI] : as noted in HPI [Diarrhea] : diarrhea [Negative] : Heme/Lymph [Fever] : no fever [Chills] : no chills [Feeling Poorly] : not feeling poorly [Feeling Tired] : not feeling tired [Recent Weight Loss (___ Lbs)] : no recent weight loss [Sore Throat] : no sore throat [Hoarseness] : no hoarseness [Abdominal Pain] : no abdominal pain [Vomiting] : no vomiting [Constipation] : no constipation [Heartburn] : no heartburn [Bleeding] : no bleeding [Bloating (gassiness)] : no bloating [Swollen Glands] : no swollen glands

## 2023-02-24 NOTE — ASSESSMENT
[FreeTextEntry1] : 57 year old female presents for follow up after colonoscopy performed for abnormal findings on CT scan of the abdomen done in the ED 7/2022 after a fall revealing questionable narrowing noted near the rectosigmoid junction with upstream moderate to large volume stool burden. Colonoscopy on 1/4/23 revealing erythema and small apthous ulcers in rectum, sigmoid polyp, path showing mild nonspecific chronic inflammation at rectum and sigmoid. Pt reporting loose stool after eating sushi prior to colonoscopy. Multiple potential etiologies identified, repeat path review suggestive of microscopic colitis and stool studies suggestive of pancreatic insufficiency ?false positive, also positive for norovirus. Pt feeling well, diarrhea has slightly improved.  \par \par -Will again review path with GI pathologist to clarify microscopic colitis findings since symptoms were relatively acute onset\par -Recommend obtain MRI/MRCP for further evaluation of pancreatic insufficiency, r/o pancreatic/ductal abnormality\par -Hygiene measures/hand washing\par -As some improvement noted may have self limiting infectious gastroenteritis and would favor monitoring for now pending above. If symptoms persist and if confirmed findings of microscopic colitis will then consider course of budesonide vs pancreatic enzyme supplement\par \par Follow up 2-4 weeks\par Pt agreeable with plan, advised to contact us if questions/concerns or if new/worsening symptoms\par \par

## 2023-02-24 NOTE — HISTORY OF PRESENT ILLNESS
[Home] : at home, [unfilled] , at the time of the visit. [Medical Office: ([facility])___] : at the medical office located in  [Verbal consent obtained from patient] : the patient, [unfilled] [FreeTextEntry4] : ALESSIO BROOKE [FreeTextEntry1] : 1/4/23 [de-identified] : Abdomen 7/2022: questionable narrowing near the rectosigmoid junction with upstream moderate to large volume stool burden, correlate with colonoscopy/sigmoidoscopy

## 2023-02-24 NOTE — REASON FOR VISIT
[Follow-up] : a follow-up of an existing diagnosis [FreeTextEntry1] : RPA - 1 MONTH F/U [id number] Comprehensive

## 2023-03-23 LAB
BASOPHILS # BLD AUTO: 0.06 K/UL
BASOPHILS NFR BLD AUTO: 0.8 %
CALPROTECTIN FECAL: 72 UG/G
EOSINOPHIL # BLD AUTO: 0.25 K/UL
EOSINOPHIL NFR BLD AUTO: 3.3 %
ERYTHROCYTE [SEDIMENTATION RATE] IN BLOOD BY WESTERGREN METHOD: 27 MM/HR
GI PCR PANEL: DETECTED
HCT VFR BLD CALC: 41.7 %
HGB BLD-MCNC: 13.8 G/DL
IMM GRANULOCYTES NFR BLD AUTO: 0.3 %
LYMPHOCYTES # BLD AUTO: 2.58 K/UL
LYMPHOCYTES NFR BLD AUTO: 33.9 %
MAN DIFF?: NORMAL
MCHC RBC-ENTMCNC: 30.8 PG
MCHC RBC-ENTMCNC: 33.1 G/DL
MCV RBC AUTO: 93.1 FL
MONOCYTES # BLD AUTO: 0.58 K/UL
MONOCYTES NFR BLD AUTO: 7.6 %
NEUTROPHILS # BLD AUTO: 4.13 K/UL
NEUTROPHILS NFR BLD AUTO: 54.1 %
NOROVIRUS GI/GII: DETECTED
PANCREATIC ELASTASE, FECAL: 144 CD:794062645
PLATELET # BLD AUTO: 324 K/UL
RBC # BLD: 4.48 M/UL
RBC # FLD: 14.2 %
TSH SERPL-ACNC: 3.99 UIU/ML
WBC # FLD AUTO: 7.62 K/UL

## 2023-03-27 ENCOUNTER — OUTPATIENT (OUTPATIENT)
Dept: OUTPATIENT SERVICES | Facility: HOSPITAL | Age: 58
LOS: 1 days | End: 2023-03-27
Payer: COMMERCIAL

## 2023-03-27 DIAGNOSIS — R19.7 DIARRHEA, UNSPECIFIED: ICD-10-CM

## 2023-03-27 DIAGNOSIS — Z00.8 ENCOUNTER FOR OTHER GENERAL EXAMINATION: ICD-10-CM

## 2023-03-27 DIAGNOSIS — R19.5 OTHER FECAL ABNORMALITIES: ICD-10-CM

## 2023-03-27 PROCEDURE — 74183 MRI ABD W/O CNTR FLWD CNTR: CPT

## 2023-03-27 PROCEDURE — A9579: CPT

## 2023-03-27 PROCEDURE — 74183 MRI ABD W/O CNTR FLWD CNTR: CPT | Mod: 26

## 2023-03-28 DIAGNOSIS — R19.7 DIARRHEA, UNSPECIFIED: ICD-10-CM

## 2023-03-28 DIAGNOSIS — R19.5 OTHER FECAL ABNORMALITIES: ICD-10-CM

## 2023-04-24 ENCOUNTER — APPOINTMENT (OUTPATIENT)
Dept: GASTROENTEROLOGY | Facility: CLINIC | Age: 58
End: 2023-04-24
Payer: COMMERCIAL

## 2023-04-24 DIAGNOSIS — R93.5 ABNORMAL FINDINGS ON DIAGNOSTIC IMAGING OF OTHER ABDOMINAL REGIONS, INCLUDING RETROPERITONEUM: ICD-10-CM

## 2023-04-24 LAB
BAKER'S YEAST AB QL: 17 UNITS
BAKER'S YEAST IGA QL IA: 19.4 UNITS
BAKER'S YEAST IGA QN IA: NEGATIVE
BAKER'S YEAST IGG QN IA: NEGATIVE
CRP SERPL-MCNC: <3 MG/L

## 2023-04-24 PROCEDURE — 99202 OFFICE O/P NEW SF 15 MIN: CPT | Mod: 95

## 2023-04-24 NOTE — REASON FOR VISIT
[Follow-up] : a follow-up of an existing diagnosis [FreeTextEntry1] : RPA - 2 MONTH F/U / MRI RESULTS

## 2023-04-24 NOTE — REVIEW OF SYSTEMS
[As Noted in HPI] : as noted in HPI [Negative] : Heme/Lymph [Fever] : no fever [Chills] : no chills [Feeling Poorly] : not feeling poorly [Feeling Tired] : not feeling tired [Recent Weight Loss (___ Lbs)] : no recent weight loss [Sore Throat] : no sore throat [Hoarseness] : no hoarseness [Abdominal Pain] : no abdominal pain [Vomiting] : no vomiting [Constipation] : no constipation [Diarrhea] : no diarrhea [Heartburn] : no heartburn [Bleeding] : no bleeding [Bloating (gassiness)] : no bloating [Swollen Glands] : no swollen glands

## 2023-04-24 NOTE — ASSESSMENT
[FreeTextEntry1] : 56yo female presents for follow up after colonoscopy performed for abnormal findings on CT scan of the abdomen done in the ED 7/2022 after a fall revealing questionable narrowing noted near the rectosigmoid junction path suggestive of microscopic colitis. Pt reporting loose stool after eating sushi prior to colonoscopy. Possibly multiple etiologies, including infectious/norovirus or microscopic colitis based on biopsies, cannot exclude falsely low elastase in setting of prior diarrhea. Symptoms now resolved.  \par \par -Discussed possible risk for recurrent diarrhea in setting of changes suggestive of microscopic colitis, though symptoms appear more consistent with infectious/post infectious etiology\par -Continue to monitor for now as symptoms have resolved\par -Repeat colonoscopy in 5 years sooner if new symptoms develop\par \par Follow up 3 months, sooner if needed\par Pt agreeable with plan, advised to contact us if questions/concerns or if new/worsening symptoms\par \par

## 2023-04-24 NOTE — HISTORY OF PRESENT ILLNESS
[Home] : at home, [unfilled] , at the time of the visit. [Medical Office: ([facility])___] : at the medical office located in  [Verbal consent obtained from patient] : the patient, [unfilled] [FreeTextEntry4] : ALESSIO BROOKE [de-identified] : Abdomen 7/2022: questionable narrowing near the rectosigmoid junction with upstream moderate to large volume stool burden, correlate with colonoscopy/sigmoidoscopy [FreeTextEntry1] : 3/27/23

## 2023-07-28 ENCOUNTER — APPOINTMENT (OUTPATIENT)
Dept: GASTROENTEROLOGY | Facility: CLINIC | Age: 58
End: 2023-07-28
Payer: COMMERCIAL

## 2023-07-28 DIAGNOSIS — K52.839 MICROSCOPIC COLITIS, UNSPECIFIED: ICD-10-CM

## 2023-07-28 PROCEDURE — 99213 OFFICE O/P EST LOW 20 MIN: CPT | Mod: 95

## 2023-07-28 NOTE — HISTORY OF PRESENT ILLNESS
[Home] : at home, [unfilled] , at the time of the visit. [Medical Office: ([facility])___] : at the medical office located in  [Verbal consent obtained from patient] : the patient, [unfilled] [FreeTextEntry4] : ALESSIO BROOKE [de-identified] : Abdomen 7/2022: questionable narrowing near the rectosigmoid junction with upstream moderate to large volume stool burden, correlate with colonoscopy/sigmoidoscopy [FreeTextEntry1] : 3/27/23

## 2023-07-28 NOTE — ASSESSMENT
[FreeTextEntry1] : 57yo female presents for follow up after colonoscopy performed for abnormal findings on CT scan of the abdomen done in the ED 7/2022 after a fall revealing questionable narrowing noted near the rectosigmoid junction path suggestive of microscopic colitis. Pt reporting loose stool after eating sushi prior to colonoscopy now resolved, suspect likely infectious/post infectious diarrhea. Clinically doing well. \par \par -Discussed possible risk for recurrent diarrhea in setting of changes suggestive of microscopic colitis, though symptoms currently resolved and appear more consistent with infectious/post infectious etiology\par -Continue to monitor for now\par -Repeat fecal elastase when able (may have been falsely low)\par -Repeat colonoscopy in 5 years sooner if new symptoms develop\par \par Follow up 6 months, sooner if needed\par Pt agreeable with plan, advised to contact us if questions/concerns or if new/worsening symptoms\par \par

## 2023-12-21 NOTE — ED PROVIDER NOTE - CHILD ABUSE FACILITY
Refused rx.  No response from patient and noted w/OV w/new provider.  ThanksJenn    For Pharmacy Admin Tracking Only    Program: Medication Refill  CPA in place:    Recommendation Provided To:   Intervention Detail: Discontinued Rx: 1, reason: Patient Preference  Intervention Accepted By:   Gap Closed?:    Time Spent (min): 5     [facility]

## 2024-01-03 ENCOUNTER — OUTPATIENT (OUTPATIENT)
Dept: OUTPATIENT SERVICES | Facility: HOSPITAL | Age: 59
LOS: 1 days | End: 2024-01-03
Payer: COMMERCIAL

## 2024-01-03 DIAGNOSIS — R92.2 INCONCLUSIVE MAMMOGRAM: ICD-10-CM

## 2024-01-03 DIAGNOSIS — Z12.31 ENCOUNTER FOR SCREENING MAMMOGRAM FOR MALIGNANT NEOPLASM OF BREAST: ICD-10-CM

## 2024-01-03 PROCEDURE — 77063 BREAST TOMOSYNTHESIS BI: CPT

## 2024-01-03 PROCEDURE — 77063 BREAST TOMOSYNTHESIS BI: CPT | Mod: 26

## 2024-01-03 PROCEDURE — 77067 SCR MAMMO BI INCL CAD: CPT

## 2024-01-03 PROCEDURE — 76641 ULTRASOUND BREAST COMPLETE: CPT | Mod: 26,50

## 2024-01-03 PROCEDURE — 76641 ULTRASOUND BREAST COMPLETE: CPT | Mod: 50

## 2024-01-03 PROCEDURE — 77067 SCR MAMMO BI INCL CAD: CPT | Mod: 26

## 2024-01-04 DIAGNOSIS — R92.2 INCONCLUSIVE MAMMOGRAM: ICD-10-CM

## 2024-01-04 DIAGNOSIS — Z12.31 ENCOUNTER FOR SCREENING MAMMOGRAM FOR MALIGNANT NEOPLASM OF BREAST: ICD-10-CM

## 2024-01-05 ENCOUNTER — APPOINTMENT (OUTPATIENT)
Dept: GASTROENTEROLOGY | Facility: CLINIC | Age: 59
End: 2024-01-05
Payer: COMMERCIAL

## 2024-01-05 PROCEDURE — 99214 OFFICE O/P EST MOD 30 MIN: CPT | Mod: 95

## 2024-01-05 NOTE — HISTORY OF PRESENT ILLNESS
[Home] : at home, [unfilled] , at the time of the visit. [Medical Office: ([facility])___] : at the medical office located in  [Verbal consent obtained from patient] : the patient, [unfilled] [FreeTextEntry4] : ALESSIO BROOKE [de-identified] : Abdomen 7/2022: questionable narrowing near the rectosigmoid junction with upstream moderate to large volume stool burden, correlate with colonoscopy/sigmoidoscopy [FreeTextEntry1] : 3/27/23

## 2024-01-05 NOTE — ASSESSMENT
[FreeTextEntry1] : 59yo female presents for follow up of diarrhea s/p colonoscopy initially performed for abnormal findings on CT scan of the abdomen showing questionable narrowing noted near the rectosigmoid junction with upstream moderate to large volume stool burden, path showing microscopic/collagenous colitis. Stool studies showing norovirus and low elastase, with interim clinical improvement now with recurrent diarrhea and reported weight loss. No anemia.  #Recurrent diarrhea, possibly secondary to microscopic/collagenous colitis vs infectious vs pancreatic insufficiency -Await stool studies, pending in lab -Encouraged adequate hydration  -Diet as tolerated, low fat, low residue for now -Will determine further treatment including possible repeat endoscopy and need to start steroids pending above  Pt agreeable with plan, advised to contact us if questions/concerns

## 2024-01-05 NOTE — REVIEW OF SYSTEMS
[As Noted in HPI] : as noted in HPI [Recent Weight Loss (___ Lbs)] : recent [unfilled] ~Ulb weight loss [Diarrhea] : diarrhea [Negative] : Genitourinary [Fever] : no fever [Chills] : no chills [Feeling Poorly] : not feeling poorly [Feeling Tired] : not feeling tired [Sore Throat] : no sore throat [Hoarseness] : no hoarseness [Abdominal Pain] : no abdominal pain [Vomiting] : no vomiting [Constipation] : no constipation [Heartburn] : no heartburn [Melena (black stool)] : no melena [Swollen Glands] : no swollen glands [FreeTextEntry2] : Approximately 10lbs in 2 month

## 2024-01-24 DIAGNOSIS — K86.89 OTHER SPECIFIED DISEASES OF PANCREAS: ICD-10-CM

## 2024-01-29 ENCOUNTER — APPOINTMENT (OUTPATIENT)
Dept: GASTROENTEROLOGY | Facility: CLINIC | Age: 59
End: 2024-01-29

## 2024-01-29 ENCOUNTER — LABORATORY RESULT (OUTPATIENT)
Age: 59
End: 2024-01-29

## 2024-01-30 ENCOUNTER — TRANSCRIPTION ENCOUNTER (OUTPATIENT)
Age: 59
End: 2024-01-30

## 2024-01-30 LAB — CDIFF BY PCR: NOT DETECTED

## 2024-01-30 RX ORDER — PANCRELIPASE LIPASE, PANCRELIPASE PROTEASE, PANCRELIPASE AMYLASE 40000; 126000; 168000 [USP'U]/1; [USP'U]/1; [USP'U]/1
40000-126000 CAPSULE, DELAYED RELEASE ORAL
Qty: 240 | Refills: 5 | Status: ACTIVE | COMMUNITY
Start: 2024-01-30 | End: 1900-01-01

## 2024-02-02 ENCOUNTER — TRANSCRIPTION ENCOUNTER (OUTPATIENT)
Age: 59
End: 2024-02-02

## 2024-02-05 ENCOUNTER — APPOINTMENT (OUTPATIENT)
Dept: GASTROENTEROLOGY | Facility: CLINIC | Age: 59
End: 2024-02-05
Payer: COMMERCIAL

## 2024-02-05 VITALS
HEIGHT: 62 IN | HEART RATE: 89 BPM | WEIGHT: 125 LBS | BODY MASS INDEX: 23 KG/M2 | SYSTOLIC BLOOD PRESSURE: 107 MMHG | DIASTOLIC BLOOD PRESSURE: 79 MMHG

## 2024-02-05 DIAGNOSIS — R19.7 DIARRHEA, UNSPECIFIED: ICD-10-CM

## 2024-02-05 LAB
GI PCR PANEL: DETECTED
PANCREATIC ELASTASE, FECAL: 186 CD:794062645
YERSINIA ENTEROCOLITICA: DETECTED

## 2024-02-05 PROCEDURE — 99204 OFFICE O/P NEW MOD 45 MIN: CPT

## 2024-02-05 PROCEDURE — 99214 OFFICE O/P EST MOD 30 MIN: CPT

## 2024-02-05 NOTE — PHYSICAL EXAM
[Alert] : alert [Healthy Appearing] : healthy appearing [No Acute Distress] : no acute distress [Sclera] : the sclera and conjunctiva were normal [No Respiratory Distress] : no respiratory distress [No Acc Muscle Use] : no accessory muscle use [Abnormal Walk] : normal gait [Oriented To Time, Place, And Person] : oriented to person, place, and time [de-identified] : Abd soft, nt, nd

## 2024-02-05 NOTE — ASSESSMENT
[FreeTextEntry1] : 57yo female presents for follow up of chronic diarrhea s/p colonoscopy initially performed for abnormal findings on CT scan of the abdomen showing questionable narrowing noted near the rectosigmoid junction with upstream moderate to large volume stool burden, path showing microscopic/collagenous colitis. Stool studies showing norovirus and low elastase, with interim clinical improvement now with recurrent diarrhea and reported weight loss.   #Chronic intermittent diarrhea May be multifactorial, possibly secondary to microscopic/collagenous colitis vs infectious vs pancreatic insufficiency Prior stool test showing norovirus, recent stool tests showing yersinia, culture negative -Repeat stool tests -Will also reattempt authorization for pancreatic enzyme supplementation -If negative stool tests will plan to start budesonide for microscopic colitis vs trial immodium for symptomatic relief -Encouraged adequate hydration  -Diet as tolerated, low fat, low residue for now  Pt agreeable with plan, advised to contact us if questions/concerns

## 2024-02-05 NOTE — REVIEW OF SYSTEMS
[Recent Weight Loss (___ Lbs)] : recent [unfilled] ~Ulb weight loss [As Noted in HPI] : as noted in HPI [Diarrhea] : diarrhea [Negative] : Heme/Lymph [Fever] : no fever [Chills] : no chills [Feeling Poorly] : not feeling poorly [Feeling Tired] : not feeling tired [Sore Throat] : no sore throat [Hoarseness] : no hoarseness [Abdominal Pain] : no abdominal pain [Vomiting] : no vomiting [Constipation] : no constipation [Heartburn] : no heartburn [Melena (black stool)] : no melena [Swollen Glands] : no swollen glands [FreeTextEntry2] : Approximately 10lbs in 2 month

## 2024-02-05 NOTE — HISTORY OF PRESENT ILLNESS
[Home] : at home, [unfilled] , at the time of the visit. [Medical Office: ([facility])___] : at the medical office located in  [Verbal consent obtained from patient] : the patient, [unfilled] [de-identified] : Abdomen 7/2022: questionable narrowing near the rectosigmoid junction with upstream moderate to large volume stool burden, correlate with colonoscopy/sigmoidoscopy [FreeTextEntry1] : 3/27/23 [FreeTextEntry4] : ALESSIO BROOKE

## 2024-02-06 RX ORDER — PANCRELIPASE 36000; 180000; 114000 [USP'U]/1; [USP'U]/1; [USP'U]/1
36000-114000 CAPSULE, DELAYED RELEASE PELLETS ORAL
Qty: 210 | Refills: 5 | Status: ACTIVE | COMMUNITY
Start: 2024-01-24 | End: 1900-01-01

## 2024-02-09 ENCOUNTER — NON-APPOINTMENT (OUTPATIENT)
Age: 59
End: 2024-02-09

## 2024-02-16 ENCOUNTER — TRANSCRIPTION ENCOUNTER (OUTPATIENT)
Age: 59
End: 2024-02-16

## 2024-03-07 LAB
BACTERIA STL CULT: NORMAL
DEPRECATED O AND P PREP STL: NORMAL

## 2024-03-12 ENCOUNTER — NON-APPOINTMENT (OUTPATIENT)
Age: 59
End: 2024-03-12

## 2024-03-13 LAB — GI PCR PANEL: NOT DETECTED

## 2024-06-20 ENCOUNTER — EMERGENCY (EMERGENCY)
Facility: HOSPITAL | Age: 59
LOS: 0 days | Discharge: HOME | End: 2024-06-20
Attending: STUDENT IN AN ORGANIZED HEALTH CARE EDUCATION/TRAINING PROGRAM
Payer: COMMERCIAL

## 2024-06-20 VITALS
HEART RATE: 102 BPM | OXYGEN SATURATION: 98 % | TEMPERATURE: 98 F | RESPIRATION RATE: 18 BRPM | WEIGHT: 125 LBS | SYSTOLIC BLOOD PRESSURE: 127 MMHG | DIASTOLIC BLOOD PRESSURE: 85 MMHG | HEIGHT: 62 IN

## 2024-06-20 VITALS
OXYGEN SATURATION: 98 % | HEART RATE: 88 BPM | DIASTOLIC BLOOD PRESSURE: 68 MMHG | RESPIRATION RATE: 18 BRPM | SYSTOLIC BLOOD PRESSURE: 122 MMHG

## 2024-06-20 DIAGNOSIS — E78.5 HYPERLIPIDEMIA, UNSPECIFIED: ICD-10-CM

## 2024-06-20 DIAGNOSIS — L29.9 PRURITUS, UNSPECIFIED: ICD-10-CM

## 2024-06-20 DIAGNOSIS — R21 RASH AND OTHER NONSPECIFIC SKIN ERUPTION: ICD-10-CM

## 2024-06-20 DIAGNOSIS — L53.9 ERYTHEMATOUS CONDITION, UNSPECIFIED: ICD-10-CM

## 2024-06-20 PROCEDURE — 99284 EMERGENCY DEPT VISIT MOD MDM: CPT

## 2024-06-20 PROCEDURE — 99283 EMERGENCY DEPT VISIT LOW MDM: CPT

## 2024-06-20 RX ADMIN — Medication 60 MILLIGRAM(S): at 09:09

## 2024-06-20 NOTE — ED PROVIDER NOTE - CLINICAL SUMMARY MEDICAL DECISION MAKING FREE TEXT BOX
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  Patient was seen by dermatologist and started on the hydroxyzine.  She was told it was sensitivity to the sun as the rash is only on sun exposed areas of her face and upper chest.  Denies other symptoms.  Patient well-appearing on exam.  Sun exposure noted to face-year-old chest.  Tan.  No vesicles or other rash.  No rash noted to other areas of the body.  Pharynx clear without erythema or edema.  Tolerating secretions.  Steroid sent to pharmacy.  Discussed return precautions and follow-up outpatient with dermatology.  Patient comfortable with plan.

## 2024-06-20 NOTE — ED ADULT NURSE NOTE - NSFALLUNIVINTERV_ED_ALL_ED
Bed/Stretcher in lowest position, wheels locked, appropriate side rails in place/Call bell, personal items and telephone in reach/Instruct patient to call for assistance before getting out of bed/chair/stretcher/Non-slip footwear applied when patient is off stretcher/[place] to call system/Physically safe environment - no spills, clutter or unnecessary equipment/Purposeful proactive rounding/Room/bathroom lighting operational, light cord in reach

## 2024-06-20 NOTE — ED PROVIDER NOTE - PATIENT PORTAL LINK FT
You can access the FollowMyHealth Patient Portal offered by [facility] by registering at the following website: [email or web address]. By joining [facility]’s FollowMyHealth portal, you will also be able to view your health information using other applications (apps) compatible with our system.

## 2024-06-20 NOTE — ED PROVIDER NOTE - OBJECTIVE STATEMENT
59-year-old female past medical history of HLD and mitral valve disease coming in with complaints of rash.  Patient reports that she has had a rash.  Patient reports that since this Sunday she has had a rash over her face, upper chest/neck, and hands.  Reporting diffuse itchiness.  Initially use saw dermatologist on Sunday who gave her hydroxyzine and told her it was a sun related rash, however no improvement with worsening itchiness of her upper extremities and some mild itchiness of her nonsun-exposed areas.

## 2024-06-20 NOTE — ED PROVIDER NOTE - PHYSICAL EXAMINATION
CONSTITUTIONAL: NAD  SKIN: Warm dry, Diffuse erythema of face and upper neck in sun exposed areas.  Minimal rash/erythema noted of extremities, but patient reports pruritus there. blanchable, negative Nikolsky sign, no oral lesions, sparing palms and soles  HEAD: NCAT  EYES: NL inspection  EXT: no pedal edema  NEURO: Grossly unremarkable  PSYCH: Cooperative, appropriate.

## 2024-06-20 NOTE — ED PROVIDER NOTE - NSFOLLOWUPINSTRUCTIONS_ED_ALL_ED_FT
Please follow-up with PCP in 1 to 3 days. Please take the prednisone as prescribed.  Please follow-up with your dermatologist. Return precautions explained in full to patient/family.    Rash    A rash is a change in the color of the skin. A rash can also change the way your skin feels. There are many different conditions and factors that can cause a rash, most of which are not dangerous. Make sure to follow up with your primary care physician or a dermatologist as instructed by your health care provider.    SEEK IMMEDIATE MEDICAL CARE IF YOU HAVE ANY OF THE FOLLOWING SYMPTOMS: fever, blisters, a rash inside your mouth, vaginal or anal pain, or altered mental status.

## 2024-07-10 ENCOUNTER — APPOINTMENT (OUTPATIENT)
Dept: PEDIATRIC ALLERGY IMMUNOLOGY | Facility: CLINIC | Age: 59
End: 2024-07-10
Payer: COMMERCIAL

## 2024-07-10 VITALS
WEIGHT: 123 LBS | BODY MASS INDEX: 22.63 KG/M2 | HEIGHT: 62 IN | SYSTOLIC BLOOD PRESSURE: 127 MMHG | DIASTOLIC BLOOD PRESSURE: 73 MMHG

## 2024-07-10 DIAGNOSIS — L25.9 UNSPECIFIED CONTACT DERMATITIS, UNSPECIFIED CAUSE: ICD-10-CM

## 2024-07-10 DIAGNOSIS — J45.20 MILD INTERMITTENT ASTHMA, UNCOMPLICATED: ICD-10-CM

## 2024-07-10 PROCEDURE — 99204 OFFICE O/P NEW MOD 45 MIN: CPT

## 2024-07-10 RX ORDER — ALBUTEROL SULFATE 90 UG/1
108 (90 BASE) INHALANT RESPIRATORY (INHALATION)
Qty: 1 | Refills: 0 | Status: ACTIVE | COMMUNITY
Start: 2024-07-10 | End: 1900-01-01

## 2024-12-13 NOTE — ED ADULT TRIAGE NOTE - HEIGHT IN FEET
[Fever] : no fever [Chills] : no chills 5 [As Noted in HPI] : as noted in HPI [Negative] : Respiratory

## 2024-12-30 ENCOUNTER — APPOINTMENT (OUTPATIENT)
Dept: GASTROENTEROLOGY | Facility: CLINIC | Age: 59
End: 2024-12-30

## 2025-01-15 ENCOUNTER — OUTPATIENT (OUTPATIENT)
Dept: OUTPATIENT SERVICES | Facility: HOSPITAL | Age: 60
LOS: 1 days | End: 2025-01-15
Payer: COMMERCIAL

## 2025-01-15 DIAGNOSIS — R92.2 INCONCLUSIVE MAMMOGRAM: ICD-10-CM

## 2025-01-15 DIAGNOSIS — Z12.31 ENCOUNTER FOR SCREENING MAMMOGRAM FOR MALIGNANT NEOPLASM OF BREAST: ICD-10-CM

## 2025-01-15 PROCEDURE — 77063 BREAST TOMOSYNTHESIS BI: CPT | Mod: 26

## 2025-01-15 PROCEDURE — 76641 ULTRASOUND BREAST COMPLETE: CPT | Mod: 26,50

## 2025-01-15 PROCEDURE — 77067 SCR MAMMO BI INCL CAD: CPT | Mod: 26

## 2025-01-15 PROCEDURE — 77063 BREAST TOMOSYNTHESIS BI: CPT

## 2025-01-15 PROCEDURE — 76641 ULTRASOUND BREAST COMPLETE: CPT | Mod: 50

## 2025-01-15 PROCEDURE — 77067 SCR MAMMO BI INCL CAD: CPT

## 2025-01-16 DIAGNOSIS — Z12.31 ENCOUNTER FOR SCREENING MAMMOGRAM FOR MALIGNANT NEOPLASM OF BREAST: ICD-10-CM

## 2025-01-16 DIAGNOSIS — R92.2 INCONCLUSIVE MAMMOGRAM: ICD-10-CM
